# Patient Record
Sex: FEMALE | Race: OTHER | Employment: UNEMPLOYED | URBAN - METROPOLITAN AREA
[De-identification: names, ages, dates, MRNs, and addresses within clinical notes are randomized per-mention and may not be internally consistent; named-entity substitution may affect disease eponyms.]

---

## 2017-03-02 ENCOUNTER — OFFICE VISIT (OUTPATIENT)
Dept: FAMILY MEDICINE CLINIC | Age: 27
End: 2017-03-02

## 2017-03-02 VITALS
HEIGHT: 63 IN | WEIGHT: 132 LBS | SYSTOLIC BLOOD PRESSURE: 117 MMHG | BODY MASS INDEX: 23.39 KG/M2 | DIASTOLIC BLOOD PRESSURE: 68 MMHG | HEART RATE: 66 BPM | TEMPERATURE: 97.3 F

## 2017-03-02 DIAGNOSIS — M79.671 RIGHT FOOT PAIN: Primary | ICD-10-CM

## 2017-03-02 RX ORDER — NAPROXEN 500 MG/1
500 TABLET ORAL 2 TIMES DAILY WITH MEALS
Qty: 60 TAB | Refills: 0 | Status: SHIPPED | OUTPATIENT
Start: 2017-03-02 | End: 2021-05-28

## 2017-03-02 NOTE — LETTER
3/2/2017 2:41 PM 
 
Ms. Ramsey Pollock [address] [id number] To whom it may concern: It is my recommendation and medical opinion that continued use of the monitoring bracelet on  
Ms. Ernst's right leg/ankle is hazardous to her health and it be removed immediately. Re-application to her left leg may cause similar symptoms/health problems. Please find an alternate mode of monitoring per your agency's protocol that will not restrict circulation. Please contact our office with any questions. Thank you, Hermann Shetty.  AMY aRi

## 2017-03-02 NOTE — PATIENT INSTRUCTIONS
Dolor de pie: Instrucciones de cuidado - [ Foot Pain: Care Instructions ]  Instrucciones de cuidado  Las lesiones de pie que causan dolor e Teri Sanam son bastante comunes. Evelyn todos los deportes o trabajos de reparación en el hogar pueden causar un paso en falso que termine con un dolor en el pie. El desgaste normal, sobre todo al [employer], también puede causar [employer]. La mayoría de las lesiones menores del pie sanarán por sí solas, y el tratamiento en el hogar suele ser todo lo que necesita. Connie si tiene wei lesión grave, quizás necesite exámenes y [place]. La atención de seguimiento es wei parte clave de cunha tratamiento y seguridad. Asegúrese de hacer y acudir a todas las citas, y llame a cunha médico si está teniendo problemas. También es wei buena idea saber los resultados de los exámenes y mantener wei lista de los medicamentos que [place]. ¿Cómo puede cuidarse en el hogar? · [employer] analgésicos (medicamentos para el dolor) exactamente yoel le fueron indicados. ¨ Si el médico le recetó un analgésico, tómelo según las indicaciones. ¨ Si no está tomando un analgésico recetado, pregúntele a cunha médico si puede giuseppe un medicamento de [employer]. · Descanse y proteja cunha pie. Deje de hacer cualquier actividad que pudiera causarle dolor. · Colóquese hielo o wei compresa fría en el pie anamaria 10 a 20 minutos cada vez. Póngase un paño spear entre el hielo y la piel. · Eleve el pie adolorido sobre wei almohada cuando se aplique hielo o en cualquier momento que se siente o acueste anamaria los 3 días siguientes. Trate de mantenerlo por encima del nivel del corazón. [place] ayudará a reducir la hinchazón. · Cunha médico podría recomendar que se envuelva el pie con un vendaje elástico. Mantenga el pie envuelto tanto tiempo yoel cunha médico lo recomiende. · Si cunha médico le recomendó usar muletas, úselas según las indicaciones. · Use zapatos amplios.   · Tan pronto yoel el dolor y la hinchazón terminen, comience a hacer ejercicios suaves con el pie. Cunha médico le puede decir qué ejercicios ayudarán. ¿Cuándo debe pedir ayuda? Llame al 911 en cualquier momento que considere que necesita atención de emergencia. Por ejemplo, llame si:  · Cunha pie se pone pálido, blancuzco, azulado o frío. Llame a cunha médico ahora mismo o busque atención médica inmediata si:  · No puede  o pararse en el pie. · Cunha pie se ve torcido o fuera de cunha posición normal.  · Cunha pie no es estable cuando pisa. · Tiene señales de infección, tales yoel:  ¨ Aumento del dolor, la hinchazón, el enrojecimiento o la temperatura. ¨ Vetas rojizas que comienzan en la terrell adolorida. ¨ Pus que supura de wei terrell del pie. Kacey Linear. · Siente cunha pie entumecido o con hormigueo. Preste especial atención a los cambios en cunha maliha y asegúrese de comunicarse con cunha médico si:  · No mejora yoel se esperaba. · Tiene moretones por wei lesión que pa más de 2 semanas. ¿Dónde puede encontrar más información en inglés? Clyde Amezcua a http://flaco-ponce.info/. Eleni Cook G197 en la búsqueda para aprender más acerca de \"Dolor de pie: Instrucciones de cuidado - [ Foot Pain: Care Instructions ]. \"  Revisado: 23 [address], 2016  Versión del contenido: 11.1  © 1686-9355 [employer]. Las instrucciones de cuidado fueron adaptadas bajo licencia por [employer] (which disclaims liability or warranty for this information). Si usted tiene [place] afección médica o sobre estas instrucciones, siempre pregunte a cunha profesional de maliha. Healthwise, Incorporated niega toda garantía o responsabilidad por cunha uso de esta información.

## 2017-03-02 NOTE — PROGRESS NOTES
Subjective:     Chief Complaint   Patient presents with    Ankle Pain        She  is a 27 y.o. female who presents for evaluation of R ankle pain x 5 weeks. Pt notes approx 1 week ago, Pt started to have pain and a pressure like feeling in her leg. Is worse at night. Will sometimes radiate into her posterior thigh/hip. No Hx of varicose veins. Will sometimes swell, mostly at night. Pain gets 8/10 intermittent, worse at night. PMH: denies     Surg: denies     NKDA    Current Rx: last took injection, q3 months for family planning in [place]         ROS  Gen - no fever/chills  Resp - no dyspnea or cough  CV - no chest pain or COLON  Rest per HPI    No past medical history on file. No past surgical history on file. No current outpatient prescriptions on file prior to visit. No current facility-administered medications on file prior to visit. Objective:     Vitals:    03/02/17 1353   BP: 117/68   Pulse: 66   Temp: 97.3 °F (36.3 °C)   TempSrc: Oral   Weight: 132 lb (59.9 kg)   Height: 5' 3.39\" (1.61 m)       Physical Examination:  General appearance - alert, well appearing, and in no distress  Eyes -sclera anicteric  Neck - supple, no significant adenopathy, no thyromegaly  Chest - clear to auscultation, no wheezes, rales or rhonchi, symmetric air entry  Heart - normal rate, regular rhythm, normal S1, S2, no murmurs, rubs, clicks or gallops  Neurological - alert, oriented, no focal findings or movement disorder noted  Abdomen-BS present/WNL x 4 quads, non-tender/distended, soft,no organomegaly        R foot below bracelet  DP pulse +2         L foot/low ext w/o bracelet      Assessment/ Plan:   Sagrario Hwang was seen today for ankle pain. Diagnoses and all orders for this visit:    Right foot pain  -     naproxen (NAPROSYN) 500 mg tablet; Take 1 Tab by mouth two (2) times daily (with meals). PRN Naproxen and ice/elevation. Will write letter requesting removal of device. Will give referral sheet to family planning clinic for [facility] injections. RTC PRN. I have discussed the diagnosis with the patient and the intended plan as seen in the above orders. The patient has received an after-visit summary and questions were answered concerning future plans. I have discussed medication side effects and warnings with the patient as well. The patient verbalizes understanding and agreement with the plan.     Follow-up Disposition: Not on File

## 2017-03-02 NOTE — PROGRESS NOTES
Patient seen in discharge with the assistance of Lori Bhagat . We reviewed the AVS, prescription and pharmacy location. The patient was also given the Family planning resources sheet and the signed letter that the provider wrote for her.  Yaz Souza RN

## 2021-05-28 ENCOUNTER — HOSPITAL ENCOUNTER (OUTPATIENT)
Dept: LAB | Age: 31
Discharge: HOME | End: 2021-05-28

## 2021-05-28 ENCOUNTER — INITIAL PRENATAL (OUTPATIENT)
Dept: FAMILY MEDICINE CLINIC | Age: 31
End: 2021-05-28

## 2021-05-28 VITALS
BODY MASS INDEX: 30.69 KG/M2 | HEIGHT: 63 IN | DIASTOLIC BLOOD PRESSURE: 64 MMHG | HEART RATE: 70 BPM | SYSTOLIC BLOOD PRESSURE: 99 MMHG | TEMPERATURE: 98.1 F | WEIGHT: 173.2 LBS | RESPIRATION RATE: 16 BRPM | OXYGEN SATURATION: 99 %

## 2021-05-28 DIAGNOSIS — Z34.90 ENCOUNTER FOR SUPERVISION OF NORMAL PREGNANCY, ANTEPARTUM, UNSPECIFIED GRAVIDITY: ICD-10-CM

## 2021-05-28 DIAGNOSIS — O99.210 OBESITY AFFECTING PREGNANCY, ANTEPARTUM: ICD-10-CM

## 2021-05-28 DIAGNOSIS — Z34.90 ENCOUNTER FOR SUPERVISION OF NORMAL PREGNANCY, ANTEPARTUM, UNSPECIFIED GRAVIDITY: Primary | ICD-10-CM

## 2021-05-28 LAB
ANTIBODY SCREEN, EXTERNAL: NEGATIVE
BILIRUB UR QL STRIP: NEGATIVE
CHLAMYDIA, EXTERNAL: NEGATIVE
GLUCOSE UR-MCNC: NEGATIVE MG/DL
HBSAG, EXTERNAL: NEGATIVE
HIV, EXTERNAL: NON REACTIVE
KETONES P FAST UR STRIP-MCNC: NORMAL MG/DL
N. GONORRHEA, EXTERNAL: NEGATIVE
PH UR STRIP: 6 [PH] (ref 4.6–8)
PROT UR QL STRIP: NEGATIVE
RPR, EXTERNAL: NON REACTIVE
RUBELLA, EXTERNAL: NORMAL
SP GR UR STRIP: 1.02 (ref 1–1.03)
TYPE, ABO & RH, EXTERNAL: NORMAL
UA UROBILINOGEN AMB POC: NORMAL (ref 0.2–1)
URINALYSIS CLARITY POC: NORMAL
URINALYSIS COLOR POC: YELLOW
URINE BLOOD POC: NEGATIVE
URINE LEUKOCYTES POC: NORMAL
URINE NITRITES POC: POSITIVE

## 2021-05-28 PROCEDURE — 0502F SUBSEQUENT PRENATAL CARE: CPT | Performed by: FAMILY MEDICINE

## 2021-05-28 PROCEDURE — 88175 CYTOPATH C/V AUTO FLUID REDO: CPT

## 2021-05-28 RX ORDER — NITROFURANTOIN (MACROCRYSTALS) 100 MG/1
100 CAPSULE ORAL 2 TIMES DAILY
Qty: 14 CAPSULE | Refills: 0 | Status: SHIPPED | OUTPATIENT
Start: 2021-05-28 | End: 2021-06-02 | Stop reason: SDUPTHER
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
## 2021-05-28 NOTE — PROGRESS NOTES
Chief Complaint   Patient presents with    Initial Prenatal Visit     C/o nausea and is feeling the baby move a little bit. 1. Have you been to the ER, urgent care clinic since your last visit? Hospitalized since your last visit? no    2. Have you seen or consulted any other health care providers outside of the [address] [place] [address] since your last visit? Include any pap smears or colon screening.  no

## 2021-05-29 LAB
ABO + RH BLD: NORMAL
BLOOD BANK CMNT PATIENT-IMP: NORMAL
BLOOD GROUP ANTIBODIES SERPL: NORMAL
ERYTHROCYTE [DISTWIDTH] IN BLOOD BY AUTOMATED COUNT: 13.1 % (ref 11.5–14.5)
EST. AVERAGE GLUCOSE BLD GHB EST-MCNC: 105 MG/DL
HBA1C MFR BLD: 5.3 % (ref 4–5.6)
HBV SURFACE AG SER QL: <0.1 INDEX
HBV SURFACE AG SER QL: NEGATIVE
HCT VFR BLD AUTO: 41.6 % (ref 35–47)
HGB BLD-MCNC: 13.3 G/DL (ref 11.5–16)
HIV 1+2 AB+HIV1 P24 AG SERPL QL IA: NONREACTIVE
HIV12 RESULT COMMENT, HHIVC: NORMAL
MCH RBC QN AUTO: 29.9 PG (ref 26–34)
MCHC RBC AUTO-ENTMCNC: 32 G/DL (ref 30–36.5)
MCV RBC AUTO: 93.5 FL (ref 80–99)
NRBC # BLD: 0 K/UL (ref 0–0.01)
NRBC BLD-RTO: 0 PER 100 WBC
PLATELET # BLD AUTO: 186 K/UL (ref 150–400)
RBC # BLD AUTO: 4.45 M/UL (ref 3.8–5.2)
RPR SER QL: NONREACTIVE
RUBV IGG SER-IMP: REACTIVE
RUBV IGG SERPL IA-ACNC: 10.4 IU/ML
SPECIMEN EXP DATE BLD: NORMAL
WBC # BLD AUTO: 6.8 K/UL (ref 3.6–11)

## 2021-05-30 LAB — VZV IGG SER IA-ACNC: 894 INDEX

## 2021-05-31 LAB
BACTERIA SPEC CULT: ABNORMAL
CC UR VC: ABNORMAL
SERVICE CMNT-IMP: ABNORMAL

## 2021-06-01 ENCOUNTER — TELEPHONE (OUTPATIENT)
Dept: FAMILY MEDICINE CLINIC | Age: 31
End: 2021-06-01

## 2021-06-01 DIAGNOSIS — Z34.90 ENCOUNTER FOR SUPERVISION OF NORMAL PREGNANCY, ANTEPARTUM, UNSPECIFIED GRAVIDITY: ICD-10-CM

## 2021-06-01 NOTE — TELEPHONE ENCOUNTER
Kelsey Nevarez, DO  You 4 days ago   DELPHINE [place] can you let this patient know it looks like she has a UTI and I sent antibiotics to her pharmacy to pick. Pardeep AKERS           Called # listed in demographics, using the  line and a male picked up and states he is Radha's father. I had the  tell him that he is not on her HIPPA form and that we cannot discuss anything.  hung up and called the # listed on patient's HIPPA form and was unable to leave a message on her voicemail.

## 2021-06-02 LAB
DEPRECATED HGB OTHER BLD-IMP: NORMAL %
HGB A MFR BLD: 97.4 % (ref 96.4–98.8)
HGB A2 MFR BLD COLUMN CHROM: 2.6 % (ref 1.8–3.2)
HGB C MFR BLD: NORMAL %
HGB F MFR BLD: 0 % (ref 0–2)
HGB FRACT BLD-IMP: NORMAL
HGB S BLD QL SOLY: NORMAL
HGB S MFR BLD: 0 %

## 2021-06-02 RX ORDER — NITROFURANTOIN (MACROCRYSTALS) 100 MG/1
100 CAPSULE ORAL 2 TIMES DAILY
Qty: 14 CAPSULE | Refills: 0 | Status: SHIPPED | OUTPATIENT
Start: 2021-06-02 | End: 2021-06-09

## 2021-06-02 NOTE — TELEPHONE ENCOUNTER
Using  line, called and spoke to patient. Discussed urine culture results. Patient asked that her medication be called to the [facility] on [address] instead. I resent her med and also reminded patient of next appointment. Patient states understanding.

## 2021-06-09 LAB
C TRACH RRNA CVX QL NAA+PROBE: NEGATIVE
CYTOLOGIST CVX/VAG CYTO: ABNORMAL
CYTOLOGY CVX/VAG DOC THIN PREP: ABNORMAL
HPV APTIMA: POSITIVE
HPV GENOTYPE 18,45: NEGATIVE
HPV GENOTYPE, 16, 507811: POSITIVE
Lab: ABNORMAL
N GONORRHOEA RRNA CVX QL NAA+PROBE: NEGATIVE
PATH REPORT.FINAL DX SPEC: ABNORMAL
STAT OF ADQ CVX/VAG CYTO-IMP: ABNORMAL

## 2021-06-14 ENCOUNTER — HOSPITAL ENCOUNTER (OUTPATIENT)
Dept: PERINATAL CARE | Age: 31
Discharge: HOME | End: 2021-06-14
Attending: OBSTETRICS & GYNECOLOGY

## 2021-06-14 PROCEDURE — 76805 OB US >/= 14 WKS SNGL FETUS: CPT | Performed by: OBSTETRICS & GYNECOLOGY

## 2021-06-21 ENCOUNTER — ROUTINE PRENATAL (OUTPATIENT)
Dept: FAMILY MEDICINE CLINIC | Age: 31
End: 2021-06-21

## 2021-06-21 DIAGNOSIS — O99.210 OBESITY AFFECTING PREGNANCY, ANTEPARTUM: ICD-10-CM

## 2021-06-21 DIAGNOSIS — B97.7: ICD-10-CM

## 2021-06-21 DIAGNOSIS — O98.312: ICD-10-CM

## 2021-06-21 DIAGNOSIS — R82.71 ASYMPTOMATIC BACTERIURIA DURING PREGNANCY: ICD-10-CM

## 2021-06-21 DIAGNOSIS — O99.891 ASYMPTOMATIC BACTERIURIA DURING PREGNANCY: ICD-10-CM

## 2021-06-21 DIAGNOSIS — Z3A.21 21 WEEKS GESTATION OF PREGNANCY: Primary | ICD-10-CM

## 2021-06-21 PROCEDURE — 0502F SUBSEQUENT PRENATAL CARE: CPT | Performed by: STUDENT IN AN ORGANIZED HEALTH CARE EDUCATION/TRAINING PROGRAM

## 2021-06-21 NOTE — PROGRESS NOTES
Amaury Pollock  33 y.o. female  1990  [address]  [id number]    [phone] (home)      [address]:    [facility] Telephone Encounter  Nikolas Duffy MD       Encounter Date: 2021 at 9:02 AM    Consent:  She and/or the health care decision maker is aware that this encounter will be billed as a routine OB appointment and that she may receive a bill for this telephone service, depending on her insurance coverage, and has provided verbal consent to proceed: Yes    Follow-up Prenatal     History of Present Illness    #[id number]    Contractions: no  LOF: no  Vaginal bleeding: no  Fetal movement (if >20 wk): yes    Pt has noticed some veins becoming more visible throughout the pregnancy. She has them in her legs and some visible at the groin. They are not painful, red, warmth. Pt was able to take her antibiotics. She took them, but will need a CHELSY. Pt had anatomy scan on 21, which was normal.     Pt was positive for HPV on her pap. Vitals/Objective:   General: Patient speaking in complete sentences without effort. Normal speech and cooperative. Due to this being a Virtual Check-in/Telephone evaluation, many elements of the physical examination are unable to be assessed. Assessment and Plan:   Amaury Pollock is a 33 y.o.  @ 21w2d evaluated by telephone. 31yo  @ 21w2d by LMP  1. IUP: Rh pos, Abs neg, Hgb fract nml, RPR/HIV/HepB neg, GC/CT neg, pap w/ NILM pos HPV16, A1c 5.3. VZV/Rub imm. Hgb 13.3. UCx pos.  - NIPT low risk, male  - MFM anatomy scan from  w/ normal anatomy. F/u as CI.  2.  Obesity - A1c 5.3.  3.  Bacteruria - Prenatal screening UCx w/ e. Coli. Confirmed finishing macrobid, will need CHELSY at follow up on .  4.  HPV16 positive - pap with NILM. scheduled for colposcopy on . Discussed today in detail including that HPV straing 16 can increase risk of cervical cancer.  Pt confirmed understanding.     -Patient informed of her next appointment: 7/2/2021 for colposcopy  -Advised to come in sooner for any concerns  -Patient was reminded about social distancing and to avoid going into public when possible. Patient understands that this encounter was a temporary measure, and the importance of further follow up and examination was emphasized. Patient verbalized understanding. I affirm this is a Patient Initiated Episode with an Established Patient who has not had a related appointment within my department in the past 7 days or scheduled within the next 24 hours. Note: not billable if this call serves to triage the patient into an appointment for the relevant concern      Electronically Signed: Gee Carter MD  Providers location when delivering service: home      ICD-10-CM ICD-9-CM    1. 21 weeks gestation of pregnancy  Z3A.21 V22.2    2. Obesity affecting pregnancy, antepartum  O99.210 649.13    3. Human papillomavirus (HPV) affecting pregnancy in second trimester  O98.312 647.63     B97.7 079.4    4. Asymptomatic bacteriuria during pregnancy  O99.891 646.50     R82.71         Pursuant to the emergency declaration under the [address] [place] [address], [facility] waiver authority and the [employer] Act, this Virtual  Visit was conducted, with patient's consent, to reduce the patient's risk of exposure to COVID-19 and provide continuity of care for an established patient. History   Patients past medical, surgical and family histories were personally reviewed and updated.   yes    Patient Active Problem List   Diagnosis Code    Right foot pain M79.671    Human papillomavirus (HPV) affecting pregnancy in second trimester O98.312, B97.7    Obesity affecting pregnancy, antepartum O99.210    Asymptomatic bacteriuria during pregnancy O99.891, R82.71              Current Medications/Allergies   Medications and Allergies reviewed:    Current Outpatient Medications   Medication Sig Dispense Refill    prenatal vit calc,iron,folic (PRENATAL VITAMIN PO) Take  by mouth.        No Known Allergies

## 2021-06-22 PROBLEM — O98.312 HUMAN PAPILLOMAVIRUS (HPV) AFFECTING PREGNANCY IN SECOND TRIMESTER: Status: ACTIVE | Noted: 2021-06-22

## 2021-06-22 PROBLEM — R82.71 ASYMPTOMATIC BACTERIURIA DURING PREGNANCY: Status: ACTIVE | Noted: 2021-06-22

## 2021-06-22 PROBLEM — O99.210 OBESITY AFFECTING PREGNANCY, ANTEPARTUM: Status: ACTIVE | Noted: 2021-06-22

## 2021-06-22 PROBLEM — O99.891 ASYMPTOMATIC BACTERIURIA DURING PREGNANCY: Status: ACTIVE | Noted: 2021-06-22

## 2021-06-22 PROBLEM — B97.7 HUMAN PAPILLOMAVIRUS (HPV) AFFECTING PREGNANCY IN SECOND TRIMESTER: Status: ACTIVE | Noted: 2021-06-22

## 2021-07-02 ENCOUNTER — OFFICE VISIT (OUTPATIENT)
Dept: FAMILY MEDICINE CLINIC | Age: 31
End: 2021-07-02

## 2021-07-02 VITALS
BODY MASS INDEX: 30.76 KG/M2 | DIASTOLIC BLOOD PRESSURE: 60 MMHG | HEIGHT: 63 IN | TEMPERATURE: 98.3 F | WEIGHT: 173.6 LBS | HEART RATE: 76 BPM | OXYGEN SATURATION: 99 % | RESPIRATION RATE: 16 BRPM | SYSTOLIC BLOOD PRESSURE: 96 MMHG

## 2021-07-02 DIAGNOSIS — R82.71 ASYMPTOMATIC BACTERIURIA DURING PREGNANCY: ICD-10-CM

## 2021-07-02 DIAGNOSIS — O98.312: ICD-10-CM

## 2021-07-02 DIAGNOSIS — O99.891 ASYMPTOMATIC BACTERIURIA DURING PREGNANCY: ICD-10-CM

## 2021-07-02 DIAGNOSIS — Z3A.21 21 WEEKS GESTATION OF PREGNANCY: Primary | ICD-10-CM

## 2021-07-02 DIAGNOSIS — B97.7: ICD-10-CM

## 2021-07-02 DIAGNOSIS — O99.210 OBESITY AFFECTING PREGNANCY, ANTEPARTUM: ICD-10-CM

## 2021-07-02 PROCEDURE — 0502F SUBSEQUENT PRENATAL CARE: CPT | Performed by: FAMILY MEDICINE

## 2021-07-02 PROCEDURE — 57452 EXAM OF CERVIX W/SCOPE: CPT | Performed by: FAMILY MEDICINE

## 2021-07-02 NOTE — PROGRESS NOTES
I was present during the critical and key portions of the procedure and immediately available to furnish services.    Repeat colpo when PP

## 2021-07-02 NOTE — PROGRESS NOTES
Alecia Ashton is a 33 y.o. female    Chief Complaint   Patient presents with    Colposcopy     She is 22 weeks and 6 days. She is not having vagiianl bleeding or discharge. She is taking her prenatal vitamins. No contractions. She is having fetal movement. No other concerns. 1. Have you been to the ER, urgent care clinic since your last visit? Hospitalized since your last visit? No    2. Have you seen or consulted any other health care providers outside of the [address] [place] [address] since your last visit? Include any pap smears or colon screening. No      Visit Vitals  BP 96/60 (BP 1 Location: Right upper arm, BP Patient Position: Sitting)   Pulse 76   Temp 98.3 °F (36.8 °C) (Oral)   Resp 16   Ht 5' 3.39\" (1.61 m)   Wt 173 lb 9.6 oz (78.7 kg)   SpO2 99%   BMI 30.37 kg/m²           Health Maintenance Due   Topic Date Due    Hepatitis C Screening  Never done    COVID-19 Vaccine (1) Never done    DTaP/Tdap/Td series (1 - Tdap) Never done         Medication Reconciliation completed, changes noted.   Please  Update medication list.

## 2021-07-02 NOTE — PROGRESS NOTES
[facility]  OFFICE PROCEDURE PROGRESS NOTE        Chart reviewed for the following:   Jean Carlos Hinkle MD, have reviewed the History, Physical and updated the Allergic reactions for [address] performed immediately prior to start of procedure:   Jean Carlos Hinkle MD, have performed the following reviews on Luci Speed prior to the start of the procedure:            * Patient was identified by name and date of birth   * Agreement on procedure being performed was verified  * Risks and Benefits explained to the patient  * Procedure site verified and marked as necessary  * Patient was positioned for comfort  * Consent was signed and verified     Time: 10.30 am    Date of procedure: 2021    Procedure performed by: Pricila Cheung MD  and Mike Goins DO    Provider assisted by: Shawn Infante LPN     Patient assisted by: self    How tolerated by patient: tolerated the procedure well with no complications    Post Procedural Pain Scale: 0 - No Hurt       Procedure Details   The risks and benefits of the procedure and written informed consent obtained.     Speculum placed in vagina and excellent visualization of cervix achieved, cervix swabbed with acetic acid solution and viewed through colposcope.     Findings:  Cervix: Ectropic multiparous cervix, there was mild increase in vascularization at 1 o'clock and 6 o'clock, small nabothian cysts with the biggest approx. 3 mm at 11 o'clock. Mild spotting noted. Transition line without visible abnormalities. No cervical lesions noted for biopsy at this time.      Vaginal inspection: normal     Vulvar colposcopy: normal      Specimens: none     Complications: none     Assessment:  Luci Speed 33 y.o.  22w6d here for colpo d/t HPV 16 positive status on recent pap smear, that was unremarkable otherwise.       Plan:  Colpo done today.  No suspicious areas or lesions for biopsy noted on cervix, vagina or vulva.      Repeat colpo PP.
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
## 2021-07-04 LAB
BACTERIA SPEC CULT: NORMAL
SERVICE CMNT-IMP: NORMAL
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
## 2021-07-19 ENCOUNTER — ROUTINE PRENATAL (OUTPATIENT)
Dept: FAMILY MEDICINE CLINIC | Age: 31
End: 2021-07-19

## 2021-07-19 VITALS
WEIGHT: 175 LBS | OXYGEN SATURATION: 100 % | RESPIRATION RATE: 16 BRPM | HEART RATE: 73 BPM | DIASTOLIC BLOOD PRESSURE: 52 MMHG | TEMPERATURE: 97.7 F | BODY MASS INDEX: 31.01 KG/M2 | HEIGHT: 63 IN | SYSTOLIC BLOOD PRESSURE: 103 MMHG

## 2021-07-19 DIAGNOSIS — I83.811 VARICOSE VEINS OF RIGHT LOWER EXTREMITY WITH PAIN: ICD-10-CM

## 2021-07-19 DIAGNOSIS — Z3A.25 25 WEEKS GESTATION OF PREGNANCY: Primary | ICD-10-CM

## 2021-07-19 LAB
ERYTHROCYTE [DISTWIDTH] IN BLOOD BY AUTOMATED COUNT: 13.5 % (ref 11.5–14.5)
GLUCOSE 1H P 100 G GLC PO SERPL-MCNC: 163 MG/DL (ref 65–140)
HCT VFR BLD AUTO: 37.5 % (ref 35–47)
HGB BLD-MCNC: 12 G/DL (ref 11.5–16)
MCH RBC QN AUTO: 29.7 PG (ref 26–34)
MCHC RBC AUTO-ENTMCNC: 32 G/DL (ref 30–36.5)
MCV RBC AUTO: 92.8 FL (ref 80–99)
NRBC # BLD: 0 K/UL (ref 0–0.01)
NRBC BLD-RTO: 0 PER 100 WBC
PLATELET # BLD AUTO: 165 K/UL (ref 150–400)
RBC # BLD AUTO: 4.04 M/UL (ref 3.8–5.2)
WBC # BLD AUTO: 7.4 K/UL (ref 3.6–11)

## 2021-07-19 PROCEDURE — 0502F SUBSEQUENT PRENATAL CARE: CPT | Performed by: STUDENT IN AN ORGANIZED HEALTH CARE EDUCATION/TRAINING PROGRAM

## 2021-07-19 NOTE — PROGRESS NOTES
I reviewed with the resident the medical history and the resident's findings on the physical examination. I discussed with the resident the patient's diagnosis and concur with the plan. 29yo  @ 25w2d by LMP  1. IUP: RH pos, NIPT wnl, GTT today   2. Obesity  3.   Abnormal pap: colpo ok in pregnancy, repeat when PP

## 2021-07-19 NOTE — PATIENT INSTRUCTIONS
Varicose Veins: Care Instructions  Your Care Instructions  Varicose veins are twisted, enlarged veins near the surface of the skin. They develop most often in the legs and ankles. Some people may be more likely than others to get varicose veins because of aging or hormone changes or because a parent has them. Being overweight or pregnant can make varicose veins worse. Jobs that require standing for long periods of time also can make them worse. Follow-up care is a key part of your treatment and safety. Be sure to make and go to all appointments, and call your doctor if you are having problems. It's also a good idea to know your test results and keep a list of the medicines you take. How can you care for yourself at home? · Wear compression stockings during the day to help relieve symptoms. They improve blood flow and are the main treatment for varicose veins. Talk to your doctor about which ones to get and where to get them. · Prop up your legs at or above the level of your heart when possible. This helps keep the blood from pooling in your lower legs and improves blood flow to the rest of your body. · Avoid sitting and standing for long periods. This puts added stress on your veins. · Get regular exercise, and control your weight. Walk, bicycle, or swim to improve blood flow in your legs. · If you bump your leg so hard that you know it is likely to bruise, prop up your leg and put ice or a cold pack on the area for 10 to 20 minutes at a time. Try to do this every 1 to 2 hours for the next 3 days (when you are awake) or until the swelling goes down. Put a thin cloth between the ice and your skin. · If you cut or scratch the skin over a vein, it may bleed a lot. Prop up your leg and apply firm pressure with a clean bandage over the site of the bleeding. Continue to apply pressure for a full 15 minutes. Do not check sooner to see if the bleeding has stopped.  If the bleeding has not stopped after 15 minutes, apply pressure again for another 15 minutes. You can repeat this up to 3 times for a total of 45 minutes. If you have a blood clot in a varicose vein, you may have tenderness and swelling over the vein. The vein may feel firm. Be sure to call your doctor right away if you have these symptoms. If your doctor has told you how to care for the clot, follow his or her instructions. Care may include the following:  · Prop up your leg and apply heat with a warm, damp cloth or a heating pad set on low (put a towel or cloth between your leg and the heating pad to prevent burns). · Ask your doctor if you can take an over-the-counter pain medicine, such as acetaminophen (Tylenol), ibuprofen (Advil, Motrin), or naproxen (Aleve). Be safe with medicines. Read and follow all instructions on the label. When should you call for help? Call 911 anytime you think you may need emergency care. For example, call if:    · You have sudden chest pain and shortness of breath, or you cough up blood. Call your doctor now or seek immediate medical care if:    · You have signs of a blood clot, such as:  ? Pain in your calf, back of the knee, thigh, or groin. ? Redness and swelling in your leg or groin.     · A varicose vein begins to bleed and you cannot stop it.     · You have a tender lump in your leg.     · You get an open sore. Watch closely for changes in your health, and be sure to contact your doctor if:    · Your varicose vein symptoms do not improve with home treatment. Where can you learn more? Go to http://www.gray.com/  Enter B637 in the search box to learn more about \"Varicose Veins: Care Instructions. \"  Current as of: March 4, 2020               Content Version: 12.8  © 2006-2021 [employer]. Care instructions adapted under license by [employer] (which disclaims liability or warranty for this information).  If you have questions about a medical condition or this instruction, always ask your healthcare professional. [place] [address] any warranty or liability for your use of this information.

## 2021-07-19 NOTE — PROGRESS NOTES
Chief Complaint   Patient presents with    Routine Prenatal Visit     25wk 2day prenatal. Patient states that she has a swollen vein on right leg and another on left lip of vagina. She feels pressure when standing on leg however the one on vagina has pressure on and off. Patient first noticed during last exam about a month ago. Patient identified with 2 patient identifiers (name and D. O. B)  Patient is a  at 25w2d    Leakage of Fluid: NO  Vaginal Bleeding: NO  Fetal Movement: YES  Prenatal vitamins: YES  Having Contractions: NO  Pain: NO    Visit Vitals  BP (!) 103/52 (BP 1 Location: Left arm, BP Patient Position: Sitting)   Pulse 73   Temp 97.7 °F (36.5 °C) (Oral)   Resp 16   Ht 5' 3.39\" (1.61 m)   Wt 175 lb (79.4 kg)   LMP 2021 (Exact Date)   SpO2 100%   BMI 30.62 kg/m²

## 2021-07-20 ENCOUNTER — TELEPHONE (OUTPATIENT)
Dept: FAMILY MEDICINE CLINIC | Age: 31
End: 2021-07-20

## 2021-07-20 DIAGNOSIS — R73.09 ABNORMAL GLUCOSE LEVEL: Primary | ICD-10-CM

## 2021-07-20 NOTE — TELEPHONE ENCOUNTER
Called patient in regards to her lab results. CBC wnl. Failed 1 hr gtt (163). I will put in a 3 hr gtt. Will make a lab appointment for patient to come in tomorrow (07/21/2021) at 9 am. Patient understands she has to come fasting.      Signed By: Tushar Miguel MD     July 20, 2021

## 2021-07-21 ENCOUNTER — LAB ONLY (OUTPATIENT)
Dept: FAMILY MEDICINE CLINIC | Age: 31
End: 2021-07-21

## 2021-07-21 DIAGNOSIS — R73.09 ABNORMAL GLUCOSE LEVEL: ICD-10-CM

## 2021-07-21 NOTE — PROGRESS NOTES
Patient became nauseous during 3 hour gestational GTT.  Per verbal order from Dr. Ryan Duarte to administer Ondansetron 4mg SL.  [id number]  EXP-JUN 2024  [id number]

## 2021-07-22 LAB
GESTATIONAL 3HR GTT,GESTA: ABNORMAL
GLUCOSE 1H P 100 G GLC PO SERPL-MCNC: 218 MG/DL (ref 65–180)
GLUCOSE P FAST SERPL-MCNC: 82 MG/DL (ref 65–95)
GLUCOSE, 2 HR,GSTT2: 193 MG/DL (ref 65–155)
GLUCOSE, 3 HR,GSTT3: 112 MG/DL (ref 65–140)

## 2021-07-26 ENCOUNTER — ROUTINE PRENATAL (OUTPATIENT)
Dept: FAMILY MEDICINE CLINIC | Age: 31
End: 2021-07-26

## 2021-07-26 DIAGNOSIS — B97.7: ICD-10-CM

## 2021-07-26 DIAGNOSIS — O99.210 OBESITY AFFECTING PREGNANCY, ANTEPARTUM: ICD-10-CM

## 2021-07-26 DIAGNOSIS — O98.312: ICD-10-CM

## 2021-07-26 DIAGNOSIS — Z3A.26 26 WEEKS GESTATION OF PREGNANCY: ICD-10-CM

## 2021-07-26 DIAGNOSIS — I83.811 VARICOSE VEINS OF RIGHT LOWER EXTREMITY WITH PAIN: ICD-10-CM

## 2021-07-26 DIAGNOSIS — O24.410 DIET CONTROLLED GESTATIONAL DIABETES MELLITUS (GDM) IN SECOND TRIMESTER: Primary | ICD-10-CM

## 2021-07-26 PROCEDURE — 0502F SUBSEQUENT PRENATAL CARE: CPT | Performed by: STUDENT IN AN ORGANIZED HEALTH CARE EDUCATION/TRAINING PROGRAM

## 2021-07-26 RX ORDER — INSULIN PUMP SYRINGE, 3 ML
EACH MISCELLANEOUS
Qty: 1 KIT | Refills: 0 | Status: SHIPPED | OUTPATIENT
Start: 2021-07-26 | End: 2021-10-27

## 2021-07-26 RX ORDER — LANCETS
EACH MISCELLANEOUS
Qty: 1 EACH | Refills: 11 | Status: SHIPPED | OUTPATIENT
Start: 2021-07-26 | End: 2021-10-27

## 2021-07-26 RX ORDER — IBUPROFEN 200 MG
CAPSULE ORAL
Qty: 90 STRIP | Refills: 2 | Status: SHIPPED | OUTPATIENT
Start: 2021-07-26 | End: 2021-10-27

## 2021-07-26 NOTE — PROGRESS NOTES
Spring Licona  33 y.o. female  1990  [address]  [id number]    [phone] (home)      [address]:    [place] Telephone Encounter  Giovanny Bob DO       Encounter Date: 2021 at 3:30 PM    Consent:  She and/or the health care decision maker is aware that this encounter will be billed as a routine OB appointment and that she may receive a bill for this telephone service, depending on her insurance coverage, and has provided verbal consent to proceed: Yes    Follow-up Prenatal     History of Present Illness     Int # [id number]    Contractions: no  LOF: no  Vaginal bleeding: no  Fetal movement (if >20 wk): yes    Had positive 3 hour GGT on       Vitals/Objective:   General: Patient speaking in complete sentences without effort. Normal speech and cooperative. Due to this being a Virtual Check-in/Telephone evaluation, many elements of the physical examination are unable to be assessed. Assessment and Plan:   Spring Licona is a 33 y.o.  @ 26w2d evaluated by telephone. IUP: Rh pos, Abs neg, Hgb fract nml, RPR/HIV/HepB neg, GC/CT neg, pap w/ NILM pos HPV16, A1c 5.3. VZV/Rub imm. Hgb 13.3. UCx pos. 1hr and 3 hr GGT positive,  - NIPT low risk, male  - MFM anatomy scan from  w/ normal anatomy. F/u as CI. Will need growth scan    GDM abnormal 3hr on .  - Blood glucose kit sent to pharmacy today per patient request. Analia Bunting her to check her glucose 4 times daily with goal of fasting <95, and 2 hr pp <120 and to keep a log  - discussed the risks of uncontrolled diabetes including death, birth injury, increased risk of preeclampsia, and diabetes postpartum  -referred to diabetes education  -will need growth scan     Obesity - A1c 5.3.     Bacteruria - Prenatal screening UCx w/ e. Coli. Confirmed finishing macrobid. CHELSY neg      Abnormal pap - pap with NILM.  s/p Colpo :No suspicious areas or lesions for biopsy noted on cervix, vagina or vulva. - Repeat Colpo pp      Varicose vein: present on the lateral aspect of right leg. Tenderness to palpation. Per patient is still present  - Right LE duplex ordered, will follow up    -Patient informed of her next appointment: 8/16/2021, but will send message to have patient seen sooner  -Advised to come in sooner for any concerns  -Pt informed that after 28 wk she will be asked to do weekly home BP monitoring and it was recommended she buy or borrow a cuff ahead of time. Alternative is going to a grocery store/pharmacy to check. One BP should be checked weekly and written in a log >28 weeks.  -Patient educated on kick counts (after 28 weeks): baby should move 10 times in 2 hours (can be a kick, roll, flutter, swish). -Patient was reminded about social distancing and to avoid going into public when possible. Patient understands that this encounter was a temporary measure, and the importance of further follow up and examination was emphasized. Patient verbalized understanding. I affirm this is a Patient Initiated Episode with an Established Patient who has not had a related appointment within my department in the past 7 days or scheduled within the next 24 hours. Note: not billable if this call serves to triage the patient into an appointment for the relevant concern      Electronically Signed: Yessica Nogueira DO  Providers location when delivering service: Home      ICD-10-CM ICD-9-CM    1. Diet controlled gestational diabetes mellitus (GDM) in second trimester  O24.410 648.83 REFERRAL TO DIABETIC EDUCATION      Blood-Glucose Meter monitoring kit      lancets misc      glucose blood VI test strips (blood glucose test) strip   2. 26 weeks gestation of pregnancy  Z3A.26 V22.2    3. Varicose veins of right lower extremity with pain  I83.811 454.8    4. Human papillomavirus (HPV) affecting pregnancy in second trimester  O98.312 647.63     B97.7 079.4    5.  Obesity affecting pregnancy, antepartum  O99.210 649.13        Pursuant to the emergency declaration under the [facility] [place] waiver authority and the [employer] and [employer] Act, this Virtual  Visit was conducted, with patient's consent, to reduce the patient's risk of exposure to COVID-19 and provide continuity of care for an established patient. History   Patients past medical, surgical and family histories were personally reviewed and updated. yes    Patient Active Problem List   Diagnosis Code    Right foot pain M79.671    Human papillomavirus (HPV) affecting pregnancy in second trimester O98.312, B97.7    Obesity affecting pregnancy, antepartum O99.210    Asymptomatic bacteriuria during pregnancy O99.891, R82.71              Current Medications/Allergies   Medications and Allergies reviewed:    Current Outpatient Medications   Medication Sig Dispense Refill    Blood-Glucose Meter monitoring kit Relion monitor. Use as directed 1 Kit 0    lancets misc Check blood glucose 4 times daily. Fasting and 2 hour after breakfast, lunch, and dinner. Goal of glucose <95 fasting and <120 postprandial 1 Each 11    glucose blood VI test strips (blood glucose test) strip Check blood glucose 4 times daily. Fasting and 2 hour after breakfast, lunch, and dinner. Goal of glucose <95 fasting and <120 postprandial 90 Strip 2    prenatal vit calc,iron,folic (PRENATAL VITAMIN PO) Take  by mouth.        No Known Allergies

## 2021-07-26 NOTE — PROGRESS NOTES
I reviewed with the resident the medical history and the resident's findings on the physical examination. I discussed with the resident the patient's diagnosis and concur with the plan. 29yo  @ 26w2d by LMP  1. IUP: RH pos, NIPT wnl, GTT today   2. Obesity  3. Abnormal pap: colpo ok in pregnancy, repeat when PP   4. GDM: Discussed risks of uncontrolled glucose including but not limited to macrosomia, birth injury, increased need for , birth defects, miscarriage/fetal demise,  hypoglycemia, persistent diabetes after pregnancy. Will start checking QID. Will schedule her for follow up in about a week or so. Will need growth scan in 3rd tri.

## 2021-08-09 ENCOUNTER — CLINICAL SUPPORT (OUTPATIENT)
Dept: DIABETES SERVICES | Age: 31
End: 2021-08-09

## 2021-08-09 DIAGNOSIS — O24.410 DIET CONTROLLED GESTATIONAL DIABETES MELLITUS (GDM) IN THIRD TRIMESTER: Primary | ICD-10-CM

## 2021-08-09 PROCEDURE — G0108 DIAB MANAGE TRN  PER INDIV: HCPCS | Performed by: DIETITIAN, REGISTERED

## 2021-08-09 NOTE — PROGRESS NOTES
[facility] Program for Diabetes Health  Diabetes Self-Management Education & Support Program  Encounter note    SUMMARY  Diabetes self-care management training was completed related to Healthy eating and Monitoring. The participant will return on August 16 to continue DSMES related to Healthy eating, Monitoring and Physical activity. The participant did identify SMART Goal(s): - to write down food intake for 3 days before next appt , and will practice knowledge and skills related to healthy eating and monitoring to improve diabetes self-management. EVALUATION:  Pt is already checking her BS but reports no other education at this time. She did not bring her BS with her but verbal report appears they are all in target but she is very hungry. Admits she loves soda and wants to know if she can still drink. [address] has her making lemonade with sugar packets. Does not appear to be an issue at this time but shared we may have to d/c this practice. . Pt does not have any exercise at this time. She was able to return demo on a plate with correct amounts of food using models. Pt reports even based on amount of food shown she would still be hungry Reminded her she can have some snacks as well if needed    RECOMMENDATIONS:  Continue to check BS 4/day- 2 hour check starts with first bite of food not the last  Limit carbs at meal per written guidelines shared and add snacks if hungry   Write down 3 days of food intake and bring to next appt    Next provider visit is scheduled for 8/16/21     was used from Kamlesh Oliveros # [id number] with [facility] EVALUATION     8/9/2021 WHAT IS DIABETES?   a. Role of the normal pancreas in energy balance and blood glucose control   b. The defect seen in diabetes   c. Signs & symptoms of diabetes   d. Diagnosis of diabetes   e.  Types of diabetes   f. Blood glucose targets in non-pregnant & non-pregnant adults       The participant knows   Their type of diabetes Yes   The basic physiologic defect No   Blood glucose targets No       DATE DSMES TOPIC EVALUATION     8/9/2021 WHAT CAN I EAT?   a. General principles   b. Determining a healthy weight   c. Nutritional terms & tools    Healthy Plate method    Carbohydrate Counting    Reading food labels    Free apps   d. Pregnancy recommendations      The participant    Uses Healthy Plate principles in constructing meals No   Reads food labels in choosing acceptable foods No    The participant would benefit from eating all carbs allowed to help hunger. DATE DSMES TOPIC EVALUATION     8/9/2021 HOW CAN BLOOD GLUCOSE MONITORING HELP ME?   a. Value of blood glucose monitoring   b. Realistic expectations   c. Blood glucose monitoring targets   d. Target adjustments   e. Setting a1c & blood glucose targets with provider   f. Meter selection    g. Technique for obtaining blood droplet   h. Blood glucose testing sites   i. Determining best times to test   j. Pregnancy recommendations   k. Data sharing with provider        The participant    Can demonstrate their glucometer procedure Yes   Logs their BG readings Yes, to bring to next visit    The participant would benefit from continue current testing. [employer] Program for Diabetes Health  Diabetes Self-Management Education & Support Program  Pre-program Assessment    Reason for Referral: GDM  Referral Source: Pollo Cho,*  Services requested: Pregnancy DSMES    ASSESSMENT    From my perspective, the participant would benefit from [facility] specifically related to Healthy eating, Monitoring and Physical activity. During the program, we will focus on providing DSMES that specifically addresses participant's interest in Healthy eating, as shown by their reported readiness to change. The participant would be best served by attending  individual sessions.     Diabetes Self-Management Education Follow-up Visit: 8/16/21       Clinical Presentation  Diana Flor is a 33 y.o.  female referred for diabetes self-management education. Participant has GDM for <1 year. Family history positive for diabetes. Patient reports not receiving DSMES services in the past.     Pt is due 10/29/21 and is . She has not had GDM in previous pregnancies. Reports she was very sick at the beginning with this pregnancy and is still using Ensure but no issues now. She does not know how much wgt she has gained. Per pt she feels she has actually lost wgt. Reports she used to be 145# but no time frame provided. Per flowsheet she has only gained 2# since May.     21 1 hr GTT  163 mg/dl  21 3 hr GTT 82, 218, 193, 112 mg/dl       Most recent A1c value:   Lab Results   Component Value Date/Time    Hemoglobin A1c 5.3 2021 04:25 PM         Diabetes-related medications:  Current dosing:   Key Antihyperglycemic Medications     Patient is on no antihyperglycemic meds. Learning Assessment  Learning objectives Educator assessment (2021)   Diabetes Disease Process  The participant can   A) describe diabetes in basic terms;   B) state the type of diabetes they have; &   C) state accepted blood glucose targets. Healthy Eating  The participant can   A) identify carbohydrate foods; &   B) accurately read food labels. Being Active  The participant can  A) state the benefits of physical activity;  B) report their current PA practices;  C) identify PA they would consider incorporating in their lives; &  D) develop an implementation plan. Monitoring  The participant can  A) operate their blood glucose meter; &  B) describe how they log their blood glucoses to share with their provider. Taking Medications  The participant can  A) name their diabetes medications;  B) state the purpose and dose;  C) note side effects; &  D) describe proper storage, disposal & transport (if appropriate).      Healthy Coping  The participant can    A) describe their response to diabetes diagnosis; B) describe their specific coping mechanisms;  C) identify supportive people and/or other resources that positively support their diabetes self-care and health. Reducing Risks  The participant can describe the preventive measures used by providers to promote health and prevent diabetes complications. Problem Solving  The participant can   A) identify signs, symptoms & treatment of hypoglycemia;   B) identify signs, symptoms & treatment of hyperglycemia;  C) describe their sick day plan; &  D) identify BG patterns to discuss with their provider. No  No  No        No  No        No  No  No  No        Yes  Yes          pt is not on medications at this time            No  No  No        No with regards to GDM          No  No  No  No     Characteristics to Learning   Barriers to Learning   [] Cognitive loss  [] Mental retardation   [] Intellectual delay/cognitive impairment  [] Psychiatric disorder  [] Visually impaired  [] Hearing loss                 [] Low literacy (difficulty with written text)  [] Low numeracy (difficulty with mathematical information  [] Low health literacy (difficulty with understanding health information & services  [x] Language  [] Functional limitation  [] Pain   [] Financial  [] Transportation  [] None   Favorite Ways to Learn   [] Lecture  [] Slides  [x] Reading [] Video-Internet  [] Cassettes/CDs/MP3's  [] Interactive Small Groups [] Other       Behavioral Assessment  Current self-care practices  Educator assessment (8/9/2021)   Healthy Eating  Current practices  24-hour Dietary Recall:  Breakfast: 2 scrambled eggs; 1 slice bread; tomato sauce; lemonade  Lunch: beef soup made with potatoes, cabbage, beef and corn; rice~1 cup, lemonade  Dinner: broth from soup; 2 tortillas and beef; lemonade  Snacks: fruit ice or jello?     Beverages: water; fruit juice from [facility], soda on occasion       Would benefit from [facility] related to Healthy Eating: Yes      Eats a carbohydrate controlled diet: No      Stage of change: Action   Being Active  Current practices  How many days during the past week have you performed physical activity where your heart beats faster and your breathing is harder than normal for 30 minutes or more?  0 days    How many days in a typical week do you perform activity such as this?  0 days     Would benefit from [facility] related to Being Active: Yes      Exercises 150 minutes/week: No      Stage of change: Contemplation     Monitoring  Current practices  Do you monitor your blood sugar? Yes    How often do you monitor? 4x/day    What are the range of readings? 74-95 mg/dL  Breakfast: 74-77 mg/dL  2 hours post breakfast: 78-80 mg/dl  2 hours post lunch: 88-95 mg/dl  2 hours post dinner: 90-95 mg/dl    All reading per verbal discussion; no records brought    Do you know your last A1c measurement? No    Do you know the meaning of the A1c? No     Would benefit from [facility] related to Monitoring: Yes      Uses BG readings to establish trends and understand BG patterns: No      Stage of change: Action   Taking Medication  Current practices  Do you understand what your diabetes medications do? na    How often do you miss doses of your diabetes medications? Not taking diabetes medication    Can you afford your diabetes medications? na   Would benefit from [facility] related to Taking Medication: No      Takes medications consistently to receive full benefit: na      Stage of change: na       Healthy Coping   Current state     Would benefit from [facility] SYSTEM related to Healthy Coping: Yes      Identifies specific people, organizations,etc, that actively support their diabetes self-care efforts: No      Stage of change: Preparation     Reducing Risks  Current state  Vaccines:  Influenza: There is no immunization history on file for this patient.       Heart Protection:  BP Readings from Last 2 Encounters:   07/19/21 (!) 103/52   07/02/21 96/60 Would benefit from [facility] related to Reducing Risks: No      Actively participates in decision-making with provider regarding secondary prevention:  Yes      Stage of change: na at this time   Problem Solving  Current state  Hypoglycemia Management:  What are signs and symptoms of hypoglycemia that you experience? Pt reported being unaware of s/s of hypoglycemia    How do you prevent hypoglycemia? Pt reported being unaware of how to prevent hypoglycemia    How do you treat hypoglycemia? Pt reported being unaware of how to treat hypoglycemia    Hyperglycemia Management:  What are signs and symptoms of hyperglycemia that you experience? Pt reported being unaware of s/s of hyperglycemia    How can you prevent hyperglycemia? Pt reported being unaware of how to prevent hyperglycemia    Sick Day Management:  What do you do differently on sick days? Pt reported being unaware of self-management on sick days    Pattern Management:  Do you notice blood glucose patterns when you look at the readings in your meter or logbook? No    How do you use the blood glucose readings from your meter or logbook?  Pt reported being unaware of pattern management skills     Would benefit from [facility] SYSTEM related to Problem Solving: No      Articulates appropriate strategies to address hypoglycemia, hyperglycemia, sick day care and BG pattern: No      Stage of change: Not appropriate at this time     Note: Content derived from the American Association of Diabetes Educators' Diabetes Education Curriculum: A Guide to Successful Self-Management (3rd edition)      Maximilian Abdi RD , [facility] on 8/9/2021 at 11:00 AM    Encounter date: 8/9/2021  Time in appointment: 80 minutes

## 2021-08-16 ENCOUNTER — ROUTINE PRENATAL (OUTPATIENT)
Dept: FAMILY MEDICINE CLINIC | Age: 31
End: 2021-08-16

## 2021-08-16 VITALS
SYSTOLIC BLOOD PRESSURE: 91 MMHG | TEMPERATURE: 97.5 F | HEART RATE: 76 BPM | DIASTOLIC BLOOD PRESSURE: 59 MMHG | OXYGEN SATURATION: 99 % | BODY MASS INDEX: 30.65 KG/M2 | WEIGHT: 173 LBS | RESPIRATION RATE: 17 BRPM | HEIGHT: 63 IN

## 2021-08-16 DIAGNOSIS — Z23 ENCOUNTER FOR IMMUNIZATION: ICD-10-CM

## 2021-08-16 DIAGNOSIS — Z3A.29 29 WEEKS GESTATION OF PREGNANCY: Primary | ICD-10-CM

## 2021-08-16 PROCEDURE — 90715 TDAP VACCINE 7 YRS/> IM: CPT | Performed by: STUDENT IN AN ORGANIZED HEALTH CARE EDUCATION/TRAINING PROGRAM

## 2021-08-16 PROCEDURE — 0502F SUBSEQUENT PRENATAL CARE: CPT | Performed by: STUDENT IN AN ORGANIZED HEALTH CARE EDUCATION/TRAINING PROGRAM

## 2021-08-16 RX ORDER — FAMOTIDINE 20 MG/1
20 TABLET, FILM COATED ORAL 2 TIMES DAILY
Qty: 60 TABLET | Refills: 3 | Status: ON HOLD | OUTPATIENT
Start: 2021-08-16 | End: 2021-10-25

## 2021-08-16 NOTE — PROGRESS NOTES
I reviewed with the resident the medical history and the resident's findings on the physical examination. I discussed with the resident the patient's diagnosis and concur with the plan. 31yo  @ 29w2d by LMP  1. IUP: RH pos, NIPT wnl, GTT today   2. GDM: Discussed risks of uncontrolled glucose including but not limited to macrosomia, birth injury, increased need for , birth defects, miscarriage/fetal demise,  hypoglycemia, persistent diabetes after pregnancy. Seen by DM ed. Glucoses at goal verbally, counseled to bring log to next visit. Scheduling growth scan. 3.  Abnormal pap: colpo ok in pregnancy, repeat when PP   4. Varicose veins: counseled normal in pregnancy   5.   Obesity
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
## 2021-09-01 ENCOUNTER — ROUTINE PRENATAL (OUTPATIENT)
Dept: FAMILY MEDICINE CLINIC | Age: 31
End: 2021-09-01
Payer: MEDICAID

## 2021-09-01 VITALS
BODY MASS INDEX: 31.22 KG/M2 | OXYGEN SATURATION: 96 % | SYSTOLIC BLOOD PRESSURE: 111 MMHG | HEIGHT: 63 IN | DIASTOLIC BLOOD PRESSURE: 65 MMHG | HEART RATE: 83 BPM | TEMPERATURE: 98.6 F | WEIGHT: 176.2 LBS

## 2021-09-01 DIAGNOSIS — O99.210 OBESITY AFFECTING PREGNANCY, ANTEPARTUM: ICD-10-CM

## 2021-09-01 DIAGNOSIS — O24.410 DIET CONTROLLED GESTATIONAL DIABETES MELLITUS (GDM), ANTEPARTUM: ICD-10-CM

## 2021-09-01 DIAGNOSIS — O99.891 ASYMPTOMATIC BACTERIURIA DURING PREGNANCY: ICD-10-CM

## 2021-09-01 DIAGNOSIS — R82.71 ASYMPTOMATIC BACTERIURIA DURING PREGNANCY: ICD-10-CM

## 2021-09-01 DIAGNOSIS — Z34.90 PREGNANCY, UNSPECIFIED GESTATIONAL AGE: Primary | ICD-10-CM

## 2021-09-01 DIAGNOSIS — K21.9 GASTROESOPHAGEAL REFLUX DISEASE, UNSPECIFIED WHETHER ESOPHAGITIS PRESENT: ICD-10-CM

## 2021-09-01 DIAGNOSIS — B97.7: ICD-10-CM

## 2021-09-01 DIAGNOSIS — O98.312: ICD-10-CM

## 2021-09-01 PROCEDURE — 0502F SUBSEQUENT PRENATAL CARE: CPT | Performed by: STUDENT IN AN ORGANIZED HEALTH CARE EDUCATION/TRAINING PROGRAM

## 2021-09-01 RX ORDER — DIPHENHYDRAMINE HCL 25 MG
TABLET ORAL SEE ADMIN INSTRUCTIONS
COMMUNITY
Start: 2021-07-26 | End: 2021-10-27

## 2021-09-01 RX ORDER — LANCETS 33 GAUGE
EACH MISCELLANEOUS
COMMUNITY
Start: 2021-07-26 | End: 2021-10-27

## 2021-09-01 NOTE — PATIENT INSTRUCTIONS
Semanas 30 a 32 de bowman embarazo: Instrucciones de cuidado  Weeks 30 to 32 of Your Pregnancy: Care Instructions  Instrucciones de cuidado    Ha llegado a los últimos meses de embarazo. A estas [place], bowman bebé en verdad comienza a tener la apariencia de un bebé, con daniela y piel rellenita. A medida que entra en las últimas semanas de [facility] comenzará a caer en la realidad de que va a tener un bebé. Collette es el momento de elegir un nombre, ordenar bowman casa, organizar un cuarto de niños seguro y buscar atención infantil de calidad, de ser necesaria. Hacer esto con anterioridad le permitirá concentrarse en cuidar y disfrutar de bowman bebé. También podría hacer wei visita a la dyan de partos del hospital para Dorann Fairy mejor idea sobre qué esperar mientras está en el hospital.  Anamaria estos últimos meses, es muy importante que se cuide y preste atención a lo que bowman cuerpo necesita. Si bowman médico le dice que está rom que trabaje, no se exija demasiado. Siga las recomendaciones proporcionadas en esta hoja de cuidados para aliviar la acidez estomacal y [address] várices. Si todavía no le valdez aplicado la vacuna Tdap (tétanos, difteria y tos [place]) anamaria collette [facility], hable con bowman médico acerca de aplicársela. Katina Morale a proteger a bowman recién nacido contra la infección por tos ferina. La atención de seguimiento es wei parte clave de bowman tratamiento y seguridad. Asegúrese de hacer y acudir a todas las citas, y llame a bowman médico si está teniendo problemas. También es wei buena idea saber los resultados de roxana exámenes y mantener wei lista de los medicamentos que [place]. ¿Cómo puede cuidarse en el hogar? Preste atención a los movimientos de bowman bebé  · Debería sentir que bowman bebé se mueve varias veces al día. · Bowman bebé ahora cambia menos de posición, y patea y da golpes con más frecuencia. · Bowman bebé duerme de 20 a 45 minutos cada [facility] y [address] en determinados momentos del día.   · Si bowman médico quiere que cuente las patadas de bowman bebé:  ? Bonnetta Kinds bowman vejiga y acuéstese de lado o recuéstese en wei silla cómoda. ? Anote la hora inicial.  ? Preste atención solo a los movimientos de bowman bebé. Cuente todos los movimientos, excepto los del hipo. ? Después de que haya contado 10 movimientos, anote la hora. ? Anote cuántos minutos le llevaron a bowman bebé los 10 movimientos. ? Si después de wei hora no ha registrado 10 movimientos, coma o deric algo, y luego cuente por otra hora. Si no registra 10 movimientos en cualquiera de las horas, llame a bowman médico.  Alivie la acidez estomacal  · Coma comidas pequeñas y frecuentes. · No coma chocolate, menta ni comidas muy picantes. Evite las bebidas con cafeína, yoel el café, el té y las sodas. · Evite doblarse hacia adelante o acostarse después de comer. · Alda wei caminata corta después de comer. · Si tiene problemas de [place] estomacal anamaria la noche, no coma por 2 horas antes de acostarse. · [place] antiácidos, yoel Mylanta, Maalox, Rolaids o Tums. No tome antiácidos que contengan bicarbonato de sodio. Cuide las várices  · Las várices son vasos sanguíneos que se dilatan debido a la mayor cantidad de cory anamaria el embarazo. Puede tener dolor o palpitaciones en las piernas. La mayoría de las várices desaparecerán después del Bim. · Evite estar mccrary anamaria mucho tiempo. Siéntese con las piernas cruzadas a la altura de los tobillos, no de las rodillas. · Siéntese con los pies elevados. · Evite usar ropa o medias ajustadas. Use medias de compresión. · Alda ejercicio con regularidad. Trate de caminar por lo menos 30 minutos al día. ¿Dónde puede encontrar más información en inglés? Vaya a [email or web address]  Corinna [id number] en la búsqueda para aprender más acerca de \"Semanas 30 a 28 de bowman embarazo: Instrucciones de cuidado. \"  Revisado: 8 octubre, 2020               Versión del contenido: 12.8  © 1575-7270 Healthwise, Incorporated.    Grant Youssef instrucciones de cuidado fueron adaptadas bajo licencia por [facility] (which disclaims liability or warranty for this information). Si usted tiene [place] afección médica o sobre estas instrucciones, siempre pregunte a bowman profesional de maliha. [facility] niega toda garantía o responsabilidad por bowman uso de esta información.

## 2021-09-01 NOTE — PROGRESS NOTES
Chief Complaint   Patient presents with    Routine Prenatal Visit     31w4d. No LOF, no bleeding. +fetal movement. No ravi petersen      Visit Vitals  /65 (BP 1 Location: Left upper arm, BP Patient Position: Sitting)   Pulse 83   Temp 98.6 °F (37 °C) (Oral)   Ht 5' 3.39\" (1.61 m)   Wt 176 lb 3.2 oz (79.9 kg)   SpO2 96%   BMI 30.83 kg/m²     1. Have you been to the ER, urgent care clinic since your last visit? Hospitalized since your last visit? No    2. Have you seen or consulted any other health care providers outside of the [address] [place] [address] since your last visit? Include any pap smears or colon screening.  No
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
## 2021-09-01 NOTE — PROGRESS NOTES
I reviewed with the resident the medical history and the resident's findings on the physical examination. I discussed with the resident the patient's diagnosis and concur with the plan. Log mostly at goal    31yo  @ 31w4d by LMP  1. IUP: RH pos, NIPT wnl  2. A1DM: Discussed risks of uncontrolled glucose including but not limited to macrosomia, birth injury, increased need for , birth defects, miscarriage/fetal demise,  hypoglycemia, persistent diabetes after pregnancy. Seen by DM ed. Glucoses at goal verbally, counseled to bring log to next visit. Growth scan scheduled   3. Abnormal pap: colpo ok in pregnancy, repeat when PP   4. Varicose veins: counseled normal in pregnancy   5.   Obesity

## 2021-09-15 ENCOUNTER — HOSPITAL ENCOUNTER (OUTPATIENT)
Dept: PERINATAL CARE | Age: 31
Discharge: HOME | End: 2021-09-15
Attending: OBSTETRICS & GYNECOLOGY
Payer: MEDICAID

## 2021-09-15 ENCOUNTER — ROUTINE PRENATAL (OUTPATIENT)
Dept: FAMILY MEDICINE CLINIC | Age: 31
End: 2021-09-15
Payer: MEDICAID

## 2021-09-15 VITALS
OXYGEN SATURATION: 100 % | WEIGHT: 176 LBS | TEMPERATURE: 98.2 F | HEART RATE: 65 BPM | SYSTOLIC BLOOD PRESSURE: 94 MMHG | RESPIRATION RATE: 18 BRPM | BODY MASS INDEX: 31.18 KG/M2 | DIASTOLIC BLOOD PRESSURE: 59 MMHG | HEIGHT: 63 IN

## 2021-09-15 DIAGNOSIS — O99.210 OBESITY AFFECTING PREGNANCY, ANTEPARTUM: ICD-10-CM

## 2021-09-15 DIAGNOSIS — Z3A.33 33 WEEKS GESTATION OF PREGNANCY: Primary | ICD-10-CM

## 2021-09-15 DIAGNOSIS — R82.71 ASYMPTOMATIC BACTERIURIA DURING PREGNANCY: ICD-10-CM

## 2021-09-15 DIAGNOSIS — O98.312: ICD-10-CM

## 2021-09-15 DIAGNOSIS — O24.410 DIET CONTROLLED GESTATIONAL DIABETES MELLITUS (GDM), ANTEPARTUM: ICD-10-CM

## 2021-09-15 DIAGNOSIS — B97.7: ICD-10-CM

## 2021-09-15 DIAGNOSIS — O99.891 ASYMPTOMATIC BACTERIURIA DURING PREGNANCY: ICD-10-CM

## 2021-09-15 PROCEDURE — 76816 OB US FOLLOW-UP PER FETUS: CPT | Performed by: OBSTETRICS & GYNECOLOGY

## 2021-09-15 PROCEDURE — 0502F SUBSEQUENT PRENATAL CARE: CPT | Performed by: STUDENT IN AN ORGANIZED HEALTH CARE EDUCATION/TRAINING PROGRAM

## 2021-09-15 NOTE — PROGRESS NOTES
Identified Patient with two Patient identifiers (Name and ). Two Patient Identifiers confirmed. Reviewed record in preparation for visit and have obtained necessary documentation. Chief Complaint   Patient presents with    Routine Prenatal Visit     33w4d       Visit Vitals  BP (!) 94/59 (BP 1 Location: Right arm, BP Patient Position: Sitting, BP Cuff Size: Adult)   Pulse 65   Temp 98.2 °F (36.8 °C) (Oral)   Resp 18   Ht 5' 3.3\" (1.608 m)   Wt 176 lb (79.8 kg)   SpO2 100%   BMI 30.88 kg/m²       1. Have you been to the ER, urgent care clinic since your last visit? Hospitalized since your last visit? No    2. Have you seen or consulted any other health care providers outside of the [address] [place] [address] since your last visit? Include any pap smears or colon screening.  No
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
## 2021-09-29 ENCOUNTER — ROUTINE PRENATAL (OUTPATIENT)
Dept: FAMILY MEDICINE CLINIC | Age: 31
End: 2021-09-29
Payer: MEDICAID

## 2021-09-29 VITALS
HEIGHT: 63 IN | RESPIRATION RATE: 20 BRPM | DIASTOLIC BLOOD PRESSURE: 67 MMHG | HEART RATE: 69 BPM | WEIGHT: 178 LBS | BODY MASS INDEX: 31.54 KG/M2 | SYSTOLIC BLOOD PRESSURE: 106 MMHG | OXYGEN SATURATION: 100 % | TEMPERATURE: 98.5 F

## 2021-09-29 DIAGNOSIS — B97.7: ICD-10-CM

## 2021-09-29 DIAGNOSIS — O99.891 ASYMPTOMATIC BACTERIURIA DURING PREGNANCY: ICD-10-CM

## 2021-09-29 DIAGNOSIS — O98.312: ICD-10-CM

## 2021-09-29 DIAGNOSIS — K21.9 GASTROESOPHAGEAL REFLUX DISEASE, UNSPECIFIED WHETHER ESOPHAGITIS PRESENT: ICD-10-CM

## 2021-09-29 DIAGNOSIS — O99.210 OBESITY AFFECTING PREGNANCY, ANTEPARTUM: ICD-10-CM

## 2021-09-29 DIAGNOSIS — O24.410 DIET CONTROLLED GESTATIONAL DIABETES MELLITUS (GDM), ANTEPARTUM: ICD-10-CM

## 2021-09-29 DIAGNOSIS — R82.71 ASYMPTOMATIC BACTERIURIA DURING PREGNANCY: ICD-10-CM

## 2021-09-29 DIAGNOSIS — Z3A.35 35 WEEKS GESTATION OF PREGNANCY: Primary | ICD-10-CM

## 2021-09-29 LAB
ERYTHROCYTE [DISTWIDTH] IN BLOOD BY AUTOMATED COUNT: 14.3 % (ref 11.5–14.5)
HCT VFR BLD AUTO: 39.4 % (ref 35–47)
HGB BLD-MCNC: 12.6 G/DL (ref 11.5–16)
MCH RBC QN AUTO: 28.8 PG (ref 26–34)
MCHC RBC AUTO-ENTMCNC: 32 G/DL (ref 30–36.5)
MCV RBC AUTO: 90.2 FL (ref 80–99)
NRBC # BLD: 0 K/UL (ref 0–0.01)
NRBC BLD-RTO: 0 PER 100 WBC
PLATELET # BLD AUTO: 179 K/UL (ref 150–400)
RBC # BLD AUTO: 4.37 M/UL (ref 3.8–5.2)
WBC # BLD AUTO: 8.3 K/UL (ref 3.6–11)

## 2021-09-29 PROCEDURE — 0502F SUBSEQUENT PRENATAL CARE: CPT | Performed by: STUDENT IN AN ORGANIZED HEALTH CARE EDUCATION/TRAINING PROGRAM
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
## 2021-09-29 NOTE — LETTER
NOTIFICATION     9/29/2021 10:25 AM    Ms. Jessica Hernandez  [address] [phone]      To Whom It May Concern:    Jessica Hernandez is currently under the care of [facility]. She will return to work/school on: If there are questions or concerns please have the patient contact our office.         Sincerely,      Park Ramesh MD
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
## 2021-09-29 NOTE — PATIENT INSTRUCTIONS
Semanas 34 a 36 de bowman embarazo: Instrucciones de cuidado  Weeks 34 to 36 of Your Pregnancy: Care Instructions  Instrucciones de cuidado    A estas [place], bowman bebé y bowman abdomen habrán crecido considerablemente. Evelyn es [place] de l[place]h a savanna. Los pulmones de bowman bebé están evelyn listos para respirar aire. Los huesos de la ben de bowman bebé ahora son bastante firmes yoel para protegerla ileana se mantienen lo suficientemente blandos yoel para atravesar el canal de [place]. Es posible que sienta entusiasmo, caroline, ansiedad o miedo. Quizá se pregunte cómo se dará cuenta de si está en trabajo de parto o qué esperar en real momento. Trate de ser flexible con roxana expectativas respecto del nacimiento. Dado que cada nacimiento es diferente, no hay manera de saber exactamente cómo será bowman parto. Esta hoja de cuidados la ayudará a saber qué esperar y cómo prepararse. Le podría facilitar el parto. Si todavía no le valdez aplicado la vacuna Tdap (tétanos, difteria y tos [place]) anamaria jason [place], hable con bowman médico acerca de aplicársela. Cj [place] a proteger a bowman recién nacido contra la infección por tos ferina. En la semana 36, a la mayoría de las mujeres se les hace wei prueba de estreptococos del triny B (GBS, por roxana siglas en inglés). Los estreptococos del triny B son bacterias comunes que pueden vivir en la vagina y el recto. Pueden hacer que bowman bebé se enferme después del parto. Si el resultado es positivo, usted recibirá antibióticos anamaria el trabajo de [place]. Los medicamentos evitarán que bowman bebé contraiga las bacterias. La atención de seguimiento es wei parte clave de bowman tratamiento y seguridad. Asegúrese de hacer y acudir a todas las citas, y llame a bowman médico si está teniendo problemas. También es wei buena idea saber los resultados de roxana exámenes y mantener wei lista de los medicamentos que [place]. ¿Cómo puede cuidarse en el hogar?   Aprenda sobre las alternativas para aliviar el dolor  · El dolor se [place] de modo diferente en cada shellie. Hable con bowman médico acerca de roxana sentimientos sobre el dolor. · Puede elegir entre varias formas de aliviar el dolor. Estas incluyen medicamentos o técnicas de respiración, así yoel medidas para estar cómoda. Usted puede utilizar más de [place] opción. · Si elige un analgésico (medicamento para el dolor) anamaria el trabajo de [place], hable con bowman médico acerca de roxana opciones. Algunos medicamentos reducen la ansiedad y [facility] a aliviar parte del dolor. Otros adormecen la parte inferior del cuerpo para que no sienta dolor. · Asegúrese de decirle a bowman médico acerca de bowman elección de analgésico antes de empezar el trabajo de parto o muy temprano en el [place]. Es posible que pueda cambiar de parecer a medida que avanza el [place]. · Cassidy vez se duerme a wei shellie con medicamentos administrados a través de wei máscara o por vía intravenosa (IV). Trabajo de parto y [place]  · La primera etapa del [place] de parto se divide en leoncio fases: [place]  y de transición. ? La mayoría de las mujeres experimentan la fase latente del [place] de parto en roxana hogares. Usted puede [employer] o descansar, comer refrigerios livianos, beber líquidos hernesto y comenzar a contar las contracciones. ? Cuando advierta que se le vuelve difícil hablar anamaria wei contracción, es posible que esté por pasar a la fase activa. Anamaria la fase Junious Amass, debería ir al hospital si no está allí aún. ? Usted está en la fase activa cuando tiene contracciones cada 3 o 4 minutos y pa alrededor de 60 segundos. El digna uterino comienza a abrirse con más rapidez.  ? Si se le rompe la hayes, las contracciones serán más intensas y más frecuentes. ? Anamaria la fase de transición, el digna uterino se estira y las contracciones se producen con Audria Colder. ? Romeo Magyar tenga deseos de pujar, sin embargo es posible que el digna uterino aún no esté preparado.  El [employer] dirá cuándo pujar.  · La segunda etapa comienza cuando el digna uterino se abre por completo y usted está lista para pujar. ? Las contracciones son muy intensas a fin de empujar al bebé por el canal de parto. ? Sentirá la necesidad de pujar. Podría sentir yoel si tuviera ganas de evacuar el intestino. ? [place] Bonier entrenen a [employer]. Estas contracciones serán muy intensas ileana no ocurrirán con tanta frecuencia. Puede descansar un poco entre contracciones. ? Es posible que esté sensible e irritable. Es posible que no se dé cuenta de lo que pasa a bowman alrededor. ? Un último esfuerzo y habrá nacido bowman bebé. · La tercera etapa ocurre cuando con unas cuantas contracciones más se expulsa la placenta. [place] puede durar 30 minutos o menos. · La cuarta etapa es la de recuperación. Es posible que se sienta abrumada con las emociones y exhausta ileana alerta. [place] es un buen momento para comenzar el amamantamiento. ¿Dónde puede encontrar más información en inglés? Vaya a [email or web address]  Alessia Tao [id number] en la búsqueda para aprender más acerca de \"Semanas 34 a 39 de bowman embarazo: Instrucciones de cuidado. \"  Revisado: 16 junio, 2021               Versión del contenido: 13.0  © 7281-3272 Healthwise, Incorporated. Las instrucciones de cuidado fueron adaptadas bajo licencia por [employer] (which disclaims liability or warranty for this information). Si usted tiene [place] afección médica o sobre estas instrucciones, siempre pregunte a bowman profesional de maliha. Healthwise, Incorporated niega toda garantía o responsabilidad por bowman uso de esta información.    24 Hour National Suicide Hotline  9-693-720-246-846-6844      [facility]:  24 hour crisis line: [phone]  Daytime number: [phone]  Psychiatry, counseling, case management, drug/alcohol addiction     [address] (Dr. Davian Starr): [email or web address]/  [phone] [facility]. [address], [address]  Phone: [phone] ([phone])  Fax: [phone]  Office Hours:  Mon: 10:00 am to 4:00 pm  Tue: 8:00 am to 6:00 pm  Wed-Thur: 8:00 am to 7:00 pm     [facility]: [email or web address]/  [place] ([phone]),  [place] ([phone])  [place] ([phone])  [facility] [facility] [address] ([phone])     [address] [facility]  Addiction/Substance abuse   [place]: [phone]  [place]: [address] [place] [address] [facility]: [facility].[employer].  [address] , [address] [place] [address], [place], [address] [place] [address]  Ph. [phone] Fax [phone]     [facility] Ted Moore: [email or web address]  [employer] ([phone])  Jaime Kent ([phone])

## 2021-09-29 NOTE — LETTER
NOTIFICATION    9/29/2021 10:26 AM    Ms. Marry Jordan  [address] [phone]      To Whom It May Concern:    Marry Jordan is currently under the care of [address] [employer]. She is currently pregnant and dealing with a lot of stress with the divorce and pregnancy. I feel it would be in the best interest of the patient and baby if all hearings could be postponed until after the pregnancy if possible. If there are questions or concerns please have the patient contact our office.         Sincerely,      Bing Wyatt MD

## 2021-09-29 NOTE — PROGRESS NOTES
Estimated Date of Delivery: 10/30/21  EFW 31st%  GDM  Follow up in 4 week or sooner if requiring insulin

## 2021-09-29 NOTE — PROGRESS NOTES
I reviewed with the resident the medical history and the resident's findings on the physical examination. I discussed with the resident the patient's diagnosis and concur with the plan. Log mostly at goal    29yo  @ 35w4d by LMP  1. IUP: RH pos, NIPT wnl, declines flu vax   2. A1DM: Discussed risks of uncontrolled glucose including but not limited to macrosomia, birth injury, increased need for , birth defects, miscarriage/fetal demise,  hypoglycemia, persistent diabetes after pregnancy. Seen by DM ed. Glucoses at goal verbally, counseled to bring log to next visit. Growth scan scheduled. EFW 31st% on 9/15. 3.  Abnormal pap: colpo ok in pregnancy, repeat when PP   4. Varicose veins: counseled normal in pregnancy   5.   Obesity
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
## 2021-10-06 ENCOUNTER — ROUTINE PRENATAL (OUTPATIENT)
Dept: FAMILY MEDICINE CLINIC | Age: 31
End: 2021-10-06
Payer: MEDICAID

## 2021-10-06 VITALS
WEIGHT: 185 LBS | OXYGEN SATURATION: 98 % | DIASTOLIC BLOOD PRESSURE: 71 MMHG | BODY MASS INDEX: 32.78 KG/M2 | SYSTOLIC BLOOD PRESSURE: 107 MMHG | HEART RATE: 66 BPM | TEMPERATURE: 98.5 F | HEIGHT: 63 IN

## 2021-10-06 DIAGNOSIS — O24.410 DIET CONTROLLED GESTATIONAL DIABETES MELLITUS (GDM), ANTEPARTUM: ICD-10-CM

## 2021-10-06 DIAGNOSIS — Z3A.36 36 WEEKS GESTATION OF PREGNANCY: Primary | ICD-10-CM

## 2021-10-06 DIAGNOSIS — R82.71 ASYMPTOMATIC BACTERIURIA DURING PREGNANCY: ICD-10-CM

## 2021-10-06 DIAGNOSIS — O99.891 ASYMPTOMATIC BACTERIURIA DURING PREGNANCY: ICD-10-CM

## 2021-10-06 DIAGNOSIS — K21.9 GASTROESOPHAGEAL REFLUX DISEASE, UNSPECIFIED WHETHER ESOPHAGITIS PRESENT: ICD-10-CM

## 2021-10-06 LAB
GLUCOSE DOSE-GTT, POCT, GLDSPOCT: 70
GRBS, EXTERNAL: NORMAL

## 2021-10-06 PROCEDURE — 82950 GLUCOSE TEST: CPT | Performed by: STUDENT IN AN ORGANIZED HEALTH CARE EDUCATION/TRAINING PROGRAM

## 2021-10-06 PROCEDURE — 0502F SUBSEQUENT PRENATAL CARE: CPT | Performed by: STUDENT IN AN ORGANIZED HEALTH CARE EDUCATION/TRAINING PROGRAM

## 2021-10-06 NOTE — PATIENT INSTRUCTIONS
Semanas 34 a 36 de bowman embarazo: Instrucciones de cuidado  Weeks 34 to 36 of Your Pregnancy: Care Instructions  Instrucciones de cuidado    A estas [place], bowman bebé y bowman abdomen habrán crecido considerablemente. Evelyn es [place] de lokesh a savanna. Los pulmones de bowman bebé están evelyn listos para respirar aire. Los huesos de la ben de bowman bebé ahora son bastante firmes yoel para protegerla ileana se mantienen lo suficientemente blandos yoel para atravesar el canal de [place]. Es posible que sienta entusiasmo, caroline, ansiedad o miedo. Quizá se pregunte cómo se dará cuenta de si está en trabajo de parto o qué esperar en real momento. Trate de ser flexible con roxana expectativas respecto del nacimiento. Dado que cada nacimiento es diferente, no hay manera de saber exactamente cómo será bowman parto. Esta hoja de cuidados la ayudará a saber qué esperar y cómo prepararse. Le podría facilitar el parto. Si todavía no le valdez aplicado la vacuna Tdap (tétanos, difteria y tos [place]) anamaria jason [place], hable con bowman médico acerca de aplicársela. Peder Heap a proteger a bowman recién nacido contra la infección por tos ferina. En la semana 36, a la mayoría de las mujeres se les hace wei prueba de estreptococos del triny B (GBS, por roxana siglas en inglés). Los estreptococos del triny B son bacterias comunes que pueden vivir en la vagina y el recto. Pueden hacer que bowman bebé se enferme después del parto. Si el resultado es positivo, usted recibirá antibióticos anamaria el trabajo de [place]. Los medicamentos evitarán que bowman bebé contraiga las bacterias. La atención de seguimiento es wei parte clave de bowman tratamiento y seguridad. Asegúrese de hacer y acudir a todas las citas, y llame a bowman médico si está teniendo problemas. También es wei buena idea saber los resultados de roxana exámenes y mantener wei lista de los medicamentos que [place]. ¿Cómo puede cuidarse en el hogar?   Aprenda sobre las alternativas para aliviar el dolor  · El dolor se [place] de modo diferente en cada shellie. Hable con bowman médico acerca de roxana sentimientos sobre el dolor. · Puede elegir entre varias formas de aliviar el dolor. Estas incluyen medicamentos o técnicas de respiración, así yoel medidas para estar cómoda. Usted puede utilizar más de [place] opción. · Si elige un analgésico (medicamento para el dolor) anamaria el trabajo de [place], hable con bowman médico acerca de roxana opciones. Algunos medicamentos reducen la ansiedad y [facility] a aliviar parte del dolor. Otros adormecen la parte inferior del cuerpo para que no sienta dolor. · Asegúrese de decirle a bowman médico acerca de bowman elección de analgésico antes de empezar el trabajo de parto o muy temprano en el Sally Bile de [place]. Es posible que pueda cambiar de parecer a medida que avanza el Sally Bile de [place]. · Cassidy vez se duerme a wei shellie con medicamentos administrados a través de wei máscara o por vía intravenosa (IV). Trabajo de parto y [place]  · La primera etapa del Sally Bile de parto se divide en leoncio fases: [place] y de transición. ? La mayoría de las mujeres experimentan la fase latente del Sally Bile de parto en roxana hogares. Usted puede [employer] o descansar, comer refrigerios livianos, beber líquidos hernesto y comenzar a contar las contracciones. ? Cuando advierta que se le vuelve difícil hablar anamaria wei contracción, es posible que esté por pasar a la fase activa. Anamaria la fase Alveta Speller, debería ir al hospital si no está allí aún. ? Usted está en la fase activa cuando tiene contracciones cada 3 o 4 minutos y pa alrededor de 60 segundos. El digna uterino comienza a abrirse con más rapidez.  ? Si se le rompe la hayes, las contracciones serán más intensas y más frecuentes. ? Anamaria la fase de transición, el digna uterino se estira y las contracciones se producen con Prakash Later. ? Cali Apa tenga deseos de pujar, sin embargo es posible que el digna uterino aún no esté preparado.  El [employer] dirá cuándo pujar.  · La segunda etapa comienza cuando el digna uterino se abre por completo y usted está lista para pujar. ? Las contracciones son muy intensas a fin de empujar al bebé por el canal de parto. ? Sentirá la necesidad de pujar. Podría sentir yoel si tuviera ganas de evacuar el intestino. ? Juniata Shy entrenen a [employer]. Estas contracciones serán muy intensas ileana no ocurrirán con tanta frecuencia. Puede descansar un poco entre contracciones. ? Es posible que esté sensible e irritable. Es posible que no se dé cuenta de lo que pasa a bowman alrededor. ? Un último esfuerzo y habrá nacido bowman bebé. · La tercera etapa ocurre cuando con unas cuantas contracciones más se expulsa la placenta. [place] puede durar 30 minutos o menos. · La cuarta etapa es la de recuperación. Es posible que se sienta abrumada con las emociones y exhausta ileana alerta. [place] es un buen momento para comenzar el amamantamiento. ¿Dónde puede encontrar más información en inglés? Vaya a http://www.fernandez.com/  Jon Row [id number] en la búsqueda para aprender más acerca de \"Semanas 34 a 39 de bowman embarazo: Instrucciones de cuidado. \"  Revisado: 16 junio, 2021               Versión del contenido: 13.0  © 4771-2965 Healthwise, Incorporated. Las instrucciones de cuidado fueron adaptadas bajo licencia por [employer] (which disclaims liability or warranty for this information). Si usted tiene [place] afección médica o sobre estas instrucciones, siempre pregunte a bowman profesional de maliha. [employer] niega toda garantía o responsabilidad por bowman uso de esta información.

## 2021-10-06 NOTE — PROGRESS NOTES
Chief Complaint   Patient presents with    Routine Prenatal Visit     36w4d. NO LOF, no bleeding. +fetal movement. No ravi petersen      Visit Vitals  /71 (BP 1 Location: Left upper arm, BP Patient Position: Sitting)   Pulse 66   Temp 98.5 °F (36.9 °C) (Oral)   Ht 5' 3.39\" (1.61 m)   Wt 185 lb (83.9 kg)   SpO2 98%   BMI 32.37 kg/m²     1. Have you been to the ER, urgent care clinic since your last visit? Hospitalized since your last visit? No    2. Have you seen or consulted any other health care providers outside of the [address] [place] [address] since your last visit? Include any pap smears or colon screening.  No

## 2021-10-06 NOTE — PROGRESS NOTES
I reviewed with the resident the medical history and the resident's findings on the physical examination. I discussed with the resident the patient's diagnosis and concur with the plan. Log mostly at goal    29yo  @ 36w4d by LMP  1. IUP: RH pos, NIPT wnl, declines flu vax, GBS collected, cephalic by sono   2. A1DM: Discussed risks of uncontrolled glucose including but not limited to macrosomia, birth injury, increased need for , birth defects, miscarriage/fetal demise,  hypoglycemia, persistent diabetes after pregnancy. Seen by DM ed. Glucoses at goal verbally, counseled to bring log to next visit. Growth scan scheduled. EFW 31st% on 9/15. 3.  Abnormal pap: colpo ok in pregnancy, repeat when PP   4. Varicose veins: counseled normal in pregnancy   5.   Obesity

## 2021-10-09 LAB
BACTERIA SPEC CULT: NORMAL
SERVICE CMNT-IMP: NORMAL

## 2021-10-11 ENCOUNTER — ROUTINE PRENATAL (OUTPATIENT)
Dept: FAMILY MEDICINE CLINIC | Age: 31
End: 2021-10-11
Payer: MEDICAID

## 2021-10-11 VITALS
WEIGHT: 185 LBS | DIASTOLIC BLOOD PRESSURE: 66 MMHG | SYSTOLIC BLOOD PRESSURE: 100 MMHG | HEART RATE: 83 BPM | BODY MASS INDEX: 32.78 KG/M2 | OXYGEN SATURATION: 98 % | HEIGHT: 63 IN | TEMPERATURE: 98 F

## 2021-10-11 DIAGNOSIS — K21.9 GASTROESOPHAGEAL REFLUX DISEASE, UNSPECIFIED WHETHER ESOPHAGITIS PRESENT: ICD-10-CM

## 2021-10-11 DIAGNOSIS — F32.A DEPRESSION AFFECTING PREGNANCY: ICD-10-CM

## 2021-10-11 DIAGNOSIS — O24.410 DIET CONTROLLED GESTATIONAL DIABETES MELLITUS (GDM), ANTEPARTUM: ICD-10-CM

## 2021-10-11 DIAGNOSIS — O99.340 DEPRESSION AFFECTING PREGNANCY: ICD-10-CM

## 2021-10-11 DIAGNOSIS — Z3A.37 37 WEEKS GESTATION OF PREGNANCY: Primary | ICD-10-CM

## 2021-10-11 DIAGNOSIS — B97.7: ICD-10-CM

## 2021-10-11 DIAGNOSIS — I83.811 VARICOSE VEINS OF RIGHT LOWER EXTREMITY WITH PAIN: ICD-10-CM

## 2021-10-11 DIAGNOSIS — O99.210 OBESITY AFFECTING PREGNANCY, ANTEPARTUM: ICD-10-CM

## 2021-10-11 DIAGNOSIS — O98.313: ICD-10-CM

## 2021-10-11 PROCEDURE — 0502F SUBSEQUENT PRENATAL CARE: CPT | Performed by: STUDENT IN AN ORGANIZED HEALTH CARE EDUCATION/TRAINING PROGRAM

## 2021-10-11 NOTE — PROGRESS NOTES
Identified Patient with two Patient identifiers (Name and ). Two Patient Identifiers confirmed. Reviewed record in preparation for visit and have obtained necessary documentation. Patient is 37w2d      LEAKAGE OF FLUID: No  BLEEDING: No  FETAL MOVEMENT: Yes  JESSIKA COLLINS CONTRACTIONS:No  PRENATAL VITAMINS: Yes  PAIN: No    Chief Complaint   Patient presents with    Routine Prenatal Visit       Visit Vitals  /66 (BP 1 Location: Left upper arm, BP Patient Position: Sitting)   Pulse 83   Temp 98 °F (36.7 °C) (Oral)   Ht 5' 3.39\" (1.61 m)   Wt 185 lb (83.9 kg)   SpO2 98%   BMI 32.37 kg/m²       1. Have you been to the ER, urgent care clinic since your last visit? Hospitalized since your last visit? No    2. Have you seen or consulted any other health care providers outside of the [address] [place] [address] since your last visit? Include any pap smears or colon screening.  No

## 2021-10-11 NOTE — PROGRESS NOTES
I reviewed with the resident the medical history and the resident's findings on the physical examination. I discussed with the resident the patient's diagnosis and concur with the plan. Mostly at goal but sounds not consistently checking    29yo  @ 37w2d by LMP  1. IUP: RH pos, NIPT wnl, declines flu vax, GBS collected, cephalic by sono   2. A1DM: Discussed risks of uncontrolled glucose including but not limited to macrosomia, birth injury, increased need for , birth defects, miscarriage/fetal demise,  hypoglycemia, persistent diabetes after pregnancy. Seen by DM ed. EFW 31st% on 9/15, has repeat on Wed. 3.  Abnormal pap: colpo ok in pregnancy, repeat when PP   4. Varicose veins: counseled normal in pregnancy   5.   Obesity
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
## 2021-10-13 ENCOUNTER — HOSPITAL ENCOUNTER (OUTPATIENT)
Dept: PERINATAL CARE | Age: 31
Discharge: HOME | End: 2021-10-13
Attending: OBSTETRICS & GYNECOLOGY
Payer: MEDICAID

## 2021-10-13 PROCEDURE — 76820 UMBILICAL ARTERY ECHO: CPT | Performed by: OBSTETRICS & GYNECOLOGY

## 2021-10-13 PROCEDURE — 76819 FETAL BIOPHYS PROFIL W/O NST: CPT | Performed by: OBSTETRICS & GYNECOLOGY

## 2021-10-13 PROCEDURE — 76816 OB US FOLLOW-UP PER FETUS: CPT | Performed by: OBSTETRICS & GYNECOLOGY

## 2021-10-18 ENCOUNTER — ROUTINE PRENATAL (OUTPATIENT)
Dept: FAMILY MEDICINE CLINIC | Age: 31
End: 2021-10-18
Payer: MEDICAID

## 2021-10-18 VITALS
BODY MASS INDEX: 32.57 KG/M2 | HEART RATE: 85 BPM | OXYGEN SATURATION: 98 % | WEIGHT: 183.8 LBS | TEMPERATURE: 98 F | HEIGHT: 63 IN | DIASTOLIC BLOOD PRESSURE: 73 MMHG | SYSTOLIC BLOOD PRESSURE: 115 MMHG

## 2021-10-18 DIAGNOSIS — O24.410 DIET CONTROLLED GESTATIONAL DIABETES MELLITUS (GDM), ANTEPARTUM: ICD-10-CM

## 2021-10-18 DIAGNOSIS — F32.A DEPRESSION AFFECTING PREGNANCY: ICD-10-CM

## 2021-10-18 DIAGNOSIS — O99.210 OBESITY AFFECTING PREGNANCY, ANTEPARTUM: ICD-10-CM

## 2021-10-18 DIAGNOSIS — Z3A.38 38 WEEKS GESTATION OF PREGNANCY: Primary | ICD-10-CM

## 2021-10-18 DIAGNOSIS — O98.312: ICD-10-CM

## 2021-10-18 DIAGNOSIS — I83.811 VARICOSE VEINS OF RIGHT LOWER EXTREMITY WITH PAIN: ICD-10-CM

## 2021-10-18 DIAGNOSIS — K21.9 GASTROESOPHAGEAL REFLUX DISEASE, UNSPECIFIED WHETHER ESOPHAGITIS PRESENT: ICD-10-CM

## 2021-10-18 DIAGNOSIS — B97.7: ICD-10-CM

## 2021-10-18 DIAGNOSIS — O99.340 DEPRESSION AFFECTING PREGNANCY: ICD-10-CM

## 2021-10-18 LAB — GLUCOSE DOSE-GTT, POCT, GLDSPOCT: 63

## 2021-10-18 PROCEDURE — 0502F SUBSEQUENT PRENATAL CARE: CPT | Performed by: STUDENT IN AN ORGANIZED HEALTH CARE EDUCATION/TRAINING PROGRAM

## 2021-10-18 PROCEDURE — 82950 GLUCOSE TEST: CPT | Performed by: STUDENT IN AN ORGANIZED HEALTH CARE EDUCATION/TRAINING PROGRAM

## 2021-10-18 NOTE — PROGRESS NOTES
I reviewed with the resident the medical history and the resident's findings on the physical examination. I discussed with the resident the patient's diagnosis and concur with the plan. Mostly at goal but sounds not consistently checking    29yo  @ 38w2d by LMP  1. IUP: RH pos, NIPT wnl, declines flu vax, GBS neg, cephalic by sono, Covid collected   2. A1DM: Discussed risks of uncontrolled glucose including but not limited to macrosomia, birth injury, increased need for , birth defects, miscarriage/fetal demise,  hypoglycemia, persistent diabetes after pregnancy. Seen by DM ed. EFW 21st% on 10/13 (AC borderline low). IOL scheduled for 10/26 (39w3d). 3.  Abnormal pap: colpo ok in pregnancy, repeat when PP   4. Varicose veins: counseled normal in pregnancy, compression stockings, elevation   5.   Obesity    Estimated Date of Delivery: 10/30/21

## 2021-10-18 NOTE — PROGRESS NOTES
Identified Patient with two Patient identifiers (Name and ). Two Patient Identifiers confirmed. Reviewed record in preparation for visit and have obtained necessary documentation. Patient is 38w2d      LEAKAGE OF FLUID: No  BLEEDING: No  FETAL MOVEMENT: YES  JESSIKA COLLINS CONTRACTIONS:YES  PRENATAL VITAMINS: No  PAIN: No    Chief Complaint   Patient presents with    Routine Prenatal Visit       Visit Vitals  /73 (BP 1 Location: Left upper arm, BP Patient Position: Sitting)   Pulse 85   Temp 98 °F (36.7 °C) (Oral)   Ht 5' 3.39\" (1.61 m)   Wt 183 lb 12.8 oz (83.4 kg)   SpO2 98%   BMI 32.16 kg/m²       1. Have you been to the ER, urgent care clinic since your last visit? Hospitalized since your last visit?no  2. Have you seen or consulted any other health care providers outside of the [address] [place] [address] since your last visit? Include any pap smears or colon screening.  No
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
## 2021-10-20 ENCOUNTER — HOSPITAL ENCOUNTER (OUTPATIENT)
Dept: PERINATAL CARE | Age: 31
Discharge: HOME | End: 2021-10-20
Attending: OBSTETRICS & GYNECOLOGY
Payer: MEDICAID

## 2021-10-20 LAB
SARS-COV-2, NAA 2 DAY TAT: NORMAL
SARS-COV-2, NAA: NOT DETECTED

## 2021-10-20 PROCEDURE — 76820 UMBILICAL ARTERY ECHO: CPT | Performed by: OBSTETRICS & GYNECOLOGY

## 2021-10-20 PROCEDURE — 76819 FETAL BIOPHYS PROFIL W/O NST: CPT | Performed by: OBSTETRICS & GYNECOLOGY

## 2021-10-25 ENCOUNTER — HOSPITAL ENCOUNTER (INPATIENT)
Age: 31
LOS: 2 days | Discharge: HOME | DRG: 560 | End: 2021-10-27
Attending: FAMILY MEDICINE | Admitting: OBSTETRICS & GYNECOLOGY
Payer: MEDICAID

## 2021-10-25 PROBLEM — Z3A.39 PREGNANCY WITH 39 COMPLETED WEEKS GESTATION: Status: ACTIVE | Noted: 2021-10-25

## 2021-10-25 LAB
ALBUMIN SERPL-MCNC: 2.4 G/DL (ref 3.5–5)
ALBUMIN/GLOB SERPL: 0.6 {RATIO} (ref 1.1–2.2)
ALP SERPL-CCNC: 185 U/L (ref 45–117)
ALT SERPL-CCNC: 15 U/L (ref 12–78)
ANION GAP SERPL CALC-SCNC: 9 MMOL/L (ref 5–15)
AST SERPL-CCNC: 14 U/L (ref 15–37)
BASOPHILS # BLD: 0 K/UL (ref 0–0.1)
BASOPHILS NFR BLD: 0 % (ref 0–1)
BILIRUB SERPL-MCNC: 0.2 MG/DL (ref 0.2–1)
BUN SERPL-MCNC: 14 MG/DL (ref 6–20)
BUN/CREAT SERPL: 19 (ref 12–20)
CALCIUM SERPL-MCNC: 8.7 MG/DL (ref 8.5–10.1)
CHLORIDE SERPL-SCNC: 108 MMOL/L (ref 97–108)
CO2 SERPL-SCNC: 19 MMOL/L (ref 21–32)
CREAT SERPL-MCNC: 0.75 MG/DL (ref 0.55–1.02)
DIFFERENTIAL METHOD BLD: ABNORMAL
EOSINOPHIL # BLD: 0.1 K/UL (ref 0–0.4)
EOSINOPHIL NFR BLD: 1 % (ref 0–7)
ERYTHROCYTE [DISTWIDTH] IN BLOOD BY AUTOMATED COUNT: 14.4 % (ref 11.5–14.5)
GLOBULIN SER CALC-MCNC: 4.2 G/DL (ref 2–4)
GLUCOSE BLD STRIP.AUTO-MCNC: 109 MG/DL (ref 65–117)
GLUCOSE SERPL-MCNC: 76 MG/DL (ref 65–100)
HCT VFR BLD AUTO: 36.9 % (ref 35–47)
HGB BLD-MCNC: 12.3 G/DL (ref 11.5–16)
IMM GRANULOCYTES # BLD AUTO: 0 K/UL (ref 0–0.04)
IMM GRANULOCYTES NFR BLD AUTO: 1 % (ref 0–0.5)
LYMPHOCYTES # BLD: 1.3 K/UL (ref 0.8–3.5)
LYMPHOCYTES NFR BLD: 16 % (ref 12–49)
MCH RBC QN AUTO: 28.7 PG (ref 26–34)
MCHC RBC AUTO-ENTMCNC: 33.3 G/DL (ref 30–36.5)
MCV RBC AUTO: 86.2 FL (ref 80–99)
MONOCYTES # BLD: 0.6 K/UL (ref 0–1)
MONOCYTES NFR BLD: 7 % (ref 5–13)
NEUTS SEG # BLD: 6.1 K/UL (ref 1.8–8)
NEUTS SEG NFR BLD: 75 % (ref 32–75)
NRBC # BLD: 0 K/UL (ref 0–0.01)
NRBC BLD-RTO: 0 PER 100 WBC
PLATELET # BLD AUTO: 155 K/UL (ref 150–400)
POTASSIUM SERPL-SCNC: 4.3 MMOL/L (ref 3.5–5.1)
PROT SERPL-MCNC: 6.6 G/DL (ref 6.4–8.2)
RBC # BLD AUTO: 4.28 M/UL (ref 3.8–5.2)
SERVICE CMNT-IMP: NORMAL
SODIUM SERPL-SCNC: 136 MMOL/L (ref 136–145)
WBC # BLD AUTO: 8.1 K/UL (ref 3.6–11)

## 2021-10-25 PROCEDURE — 59200 INSERT CERVICAL DILATOR: CPT | Performed by: OBSTETRICS & GYNECOLOGY

## 2021-10-25 PROCEDURE — 2709999900 HC NON-CHARGEABLE SUPPLY

## 2021-10-25 PROCEDURE — 77030028565 HC CATH CERV RIPNG BLN COOK -B

## 2021-10-25 PROCEDURE — 85025 COMPLETE CBC W/AUTO DIFF WBC: CPT

## 2021-10-25 PROCEDURE — 36415 COLL VENOUS BLD VENIPUNCTURE: CPT

## 2021-10-25 PROCEDURE — 75410000002 HC LABOR FEE PER 1 HR: Performed by: OBSTETRICS & GYNECOLOGY

## 2021-10-25 PROCEDURE — 65270000029 HC RM PRIVATE

## 2021-10-25 PROCEDURE — 80053 COMPREHEN METABOLIC PANEL: CPT

## 2021-10-25 PROCEDURE — 82962 GLUCOSE BLOOD TEST: CPT

## 2021-10-25 RX ORDER — SODIUM CHLORIDE 0.9 % (FLUSH) 0.9 %
5-40 SYRINGE (ML) INJECTION EVERY 8 HOURS
Status: DISCONTINUED | OUTPATIENT
Start: 2021-10-25 | End: 2021-10-27 | Stop reason: HOSPADM

## 2021-10-25 RX ORDER — SODIUM CHLORIDE, SODIUM LACTATE, POTASSIUM CHLORIDE, CALCIUM CHLORIDE 600; 310; 30; 20 MG/100ML; MG/100ML; MG/100ML; MG/100ML
125 INJECTION, SOLUTION INTRAVENOUS CONTINUOUS
Status: DISCONTINUED | OUTPATIENT
Start: 2021-10-26 | End: 2021-10-27 | Stop reason: HOSPADM

## 2021-10-25 RX ORDER — SODIUM CHLORIDE, SODIUM LACTATE, POTASSIUM CHLORIDE, CALCIUM CHLORIDE 600; 310; 30; 20 MG/100ML; MG/100ML; MG/100ML; MG/100ML
125 INJECTION, SOLUTION INTRAVENOUS CONTINUOUS
Status: DISCONTINUED | OUTPATIENT
Start: 2021-10-25 | End: 2021-10-25

## 2021-10-25 RX ORDER — OXYTOCIN/RINGER'S LACTATE 30/500 ML
0-20 PLASTIC BAG, INJECTION (ML) INTRAVENOUS
Status: DISCONTINUED | OUTPATIENT
Start: 2021-10-26 | End: 2021-10-27 | Stop reason: HOSPADM

## 2021-10-25 RX ORDER — ACETAMINOPHEN 500 MG
500 TABLET ORAL
Status: DISCONTINUED | OUTPATIENT
Start: 2021-10-25 | End: 2021-10-27 | Stop reason: HOSPADM

## 2021-10-25 RX ORDER — OXYTOCIN/RINGER'S LACTATE 30/500 ML
10 PLASTIC BAG, INJECTION (ML) INTRAVENOUS AS NEEDED
Status: DISCONTINUED | OUTPATIENT
Start: 2021-10-25 | End: 2021-10-27 | Stop reason: HOSPADM

## 2021-10-25 RX ORDER — OXYTOCIN/RINGER'S LACTATE 30/500 ML
87.3 PLASTIC BAG, INJECTION (ML) INTRAVENOUS AS NEEDED
Status: COMPLETED | OUTPATIENT
Start: 2021-10-25 | End: 2021-10-26

## 2021-10-25 RX ORDER — SODIUM CHLORIDE 0.9 % (FLUSH) 0.9 %
5-40 SYRINGE (ML) INJECTION AS NEEDED
Status: DISCONTINUED | OUTPATIENT
Start: 2021-10-25 | End: 2021-10-27 | Stop reason: HOSPADM
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
## 2021-10-25 NOTE — PROGRESS NOTES
1600: Pt arrived to L&D room 203 for IOL. 1604: Dr. Yarelis Harkins at bedside using [address] [place] [address] [phone] to assess pt and discuss POC.    2105: Dr. Yarelis Harkins and Dr. Kyung Holder at bedside for cook cath placement. SVE per MD, 1.5 cm. Cook cath placed per MD, 60 cc sterile water injected in both uterine and vaginal balloons.  remains online via [employer], [facility] RN using  for admission and assessment. Opportunity for questions provided. 1900: Bedside and Verbal shift change report given to REJI Lundberg RN (oncoming nurse) by KATY Barraza (offgoing nurse). Report included the following information SBAR, Kardex, Procedure Summary, Intake/Output, MAR, Accordion, Recent Results and Med Rec Status.

## 2021-10-25 NOTE — PROGRESS NOTES
Problem: Vaginal Delivery: Day of Deliver-Laboring  Goal: Activity/Safety  Outcome: Progressing Towards Goal     Problem: Vaginal Delivery: Day of Deliver-Laboring  Goal: Consults, if ordered  Outcome: Progressing Towards Goal     Problem: Vaginal Delivery: Day of Deliver-Laboring  Goal: Nutrition/Diet  Outcome: Progressing Towards Goal     Problem: Vaginal Delivery: Day of Deliver-Laboring  Goal: Medications  Outcome: Progressing Towards Goal

## 2021-10-26 LAB
GLUCOSE BLD STRIP.AUTO-MCNC: 84 MG/DL (ref 65–117)
SERVICE CMNT-IMP: NORMAL

## 2021-10-26 PROCEDURE — 74011250636 HC RX REV CODE- 250/636: Performed by: STUDENT IN AN ORGANIZED HEALTH CARE EDUCATION/TRAINING PROGRAM

## 2021-10-26 PROCEDURE — 74011250637 HC RX REV CODE- 250/637: Performed by: OBSTETRICS & GYNECOLOGY

## 2021-10-26 PROCEDURE — 82962 GLUCOSE BLOOD TEST: CPT

## 2021-10-26 PROCEDURE — 75410000000 HC DELIVERY VAGINAL/SINGLE: Performed by: OBSTETRICS & GYNECOLOGY

## 2021-10-26 PROCEDURE — 75410000003 HC RECOV DEL/VAG/CSECN EA 0.5 HR: Performed by: OBSTETRICS & GYNECOLOGY

## 2021-10-26 PROCEDURE — 77010026065 HC OXYGEN MINIMUM MEDICAL AIR: Performed by: OBSTETRICS & GYNECOLOGY

## 2021-10-26 PROCEDURE — 65270000029 HC RM PRIVATE

## 2021-10-26 PROCEDURE — 10907ZC DRAINAGE OF AMNIOTIC FLUID, THERAPEUTIC FROM PRODUCTS OF CONCEPTION, VIA NATURAL OR ARTIFICIAL OPENING: ICD-10-PCS | Performed by: OBSTETRICS & GYNECOLOGY

## 2021-10-26 PROCEDURE — 75410000002 HC LABOR FEE PER 1 HR: Performed by: OBSTETRICS & GYNECOLOGY

## 2021-10-26 PROCEDURE — 2709999900 HC NON-CHARGEABLE SUPPLY

## 2021-10-26 PROCEDURE — 76815 OB US LIMITED FETUS(S): CPT | Performed by: OBSTETRICS & GYNECOLOGY

## 2021-10-26 RX ORDER — ZOLPIDEM TARTRATE 5 MG/1
5 TABLET ORAL
Status: DISCONTINUED | OUTPATIENT
Start: 2021-10-26 | End: 2021-10-27 | Stop reason: HOSPADM

## 2021-10-26 RX ORDER — NALOXONE HYDROCHLORIDE 0.4 MG/ML
0.4 INJECTION, SOLUTION INTRAMUSCULAR; INTRAVENOUS; SUBCUTANEOUS AS NEEDED
Status: DISCONTINUED | OUTPATIENT
Start: 2021-10-26 | End: 2021-10-27 | Stop reason: HOSPADM

## 2021-10-26 RX ORDER — PEPPERMINT OIL
SPIRIT ORAL
Status: DISPENSED
Start: 2021-10-26 | End: 2021-10-26

## 2021-10-26 RX ORDER — IBUPROFEN 800 MG/1
800 TABLET ORAL EVERY 8 HOURS
Status: DISCONTINUED | OUTPATIENT
Start: 2021-10-26 | End: 2021-10-27 | Stop reason: HOSPADM

## 2021-10-26 RX ADMIN — IBUPROFEN 800 MG: 800 TABLET, FILM COATED ORAL at 15:47

## 2021-10-26 RX ADMIN — IBUPROFEN 800 MG: 800 TABLET, FILM COATED ORAL at 23:58

## 2021-10-26 RX ADMIN — OXYTOCIN 87.3 MILLI-UNITS/MIN: 10 INJECTION, SOLUTION INTRAMUSCULAR; INTRAVENOUS at 04:03

## 2021-10-26 RX ADMIN — SODIUM CHLORIDE, POTASSIUM CHLORIDE, SODIUM LACTATE AND CALCIUM CHLORIDE 500 ML: 600; 310; 30; 20 INJECTION, SOLUTION INTRAVENOUS at 00:50

## 2021-10-26 RX ADMIN — OXYTOCIN 87.3 MILLI-UNITS/MIN: 10 INJECTION, SOLUTION INTRAMUSCULAR; INTRAVENOUS at 05:07

## 2021-10-26 RX ADMIN — IBUPROFEN 800 MG: 800 TABLET, FILM COATED ORAL at 05:07

## 2021-10-26 NOTE — L&D DELIVERY NOTE
This patient was 30 or more weeks gestation at the time of [facility] go-live. For complete information pertaining to this patient's pregnancy, please refer to the paper chart and ACOG form. Delivery Note    Obstetrician:  Juan Carlos Dennison MD    Assistant: none    Pre-Delivery Diagnosis: Term pregnancy    Post-Delivery Diagnosis: Living  infant(s) and Male    Intrapartum Event: None    Procedure: Spontaneous vaginal delivery    Epidural: NO    Monitor:  Fetal Heart Tones - Internal and Uterine Contractions - External    Indications for instrumental delivery: none    Estimated Blood Loss: No data found    Episiotomy: n/a    Laceration(s):  none    Laceration(s) repair: NO    Presentation: Cephalic    Fetal Description: andrew    Fetal Position: Right Occiput Anterior    Birth Weight: pending    Birth Length: pending    Apgar - One Minute: 8    Apgar - Five Minutes: 9    Umbilical Cord: Nuchal Cord x  1    Specimens: placenta, discarded           Complications:  none           Cord Blood Results:   Information for the patient's :   Clarisse Li [[id number]]   No results found for: JAN Bailey     Prenatal Labs:     Lab Results   Component Value Date/Time    ABO/Rh(D) A POSITIVE 2021 04:25 PM    ABO,Rh A Positive 2021 12:00 AM    HBsAg, External negative 2021 12:00 AM    HIV, External non reactive 2021 12:00 AM    Rubella, External immune 2021 12:00 AM    RPR, External non reactive  2021 12:00 AM    Gonorrhea, External negative 2021 12:00 AM    Chlamydia, External negative 2021 12:00 AM    GrBStrep, External Negativr 10/06/2021 12:00 AM        Attending Attestation: I performed the procedure    Signed By:  Juan Carlos Dennison MD     2021

## 2021-10-26 NOTE — PROGRESS NOTES
Bedside and Verbal shift change report given to [address] [place] [address] (oncoming nurse) by Opal JUAREZ (offgoing nurse). Report included the following information SBAR, Kardex, Intake/Output, MAR and Recent Results.

## 2021-10-26 NOTE — PROGRESS NOTES
10/26/2021  11:18 AM    CM met with MARY to complete initial assessment and begin discharge planning. MOB verified and confirmed demographics. MARY lives with spouse/FOB- Paula Haro ( [phone]), along with her children ages: 16, 15, 6, and 8yr old, at the address on file. MARY does not work and plans to be home with infant. FOB is also employed and will be taking adequate time off. MARY reports she has good family support, and feels like she has the support she needs when she returns home. MARY plans to breast feed baby. Deidre Crissy will provide follow up care for infant. MARY has car seat, bassinet/crib, clothing, bottles and all necessary supplies for baby. MARY does not have insurance and is interested in applying for Medicaid for herself and infant. [employer] has met with MARY and assisted with application. MARY is also enrolled in [facility] services and understands how to add baby to program.  Care Management Interventions  PCP Verified by CM: Yes ([facility])  Mode of Transport at Discharge:  Other (see comment)  Transition of Care Consult (CM Consult): Discharge Planning  Support Systems: Other Family Member(s), Spouse/Significant Other  Confirm Follow Up Transport: Family  Discharge Location  Discharge Placement: Home with family assistance  Ryan Gold

## 2021-10-26 NOTE — LACTATION NOTE
This note was copied from a baby's chart. Mother nursing infant comfortably in cradle position. This is mothers fifth baby to breastfeed. Patient's daughter translated in the room. Mother feels comfortable feeding baby. Questions regarding frequency and duration of feedings answered. Breast pads, lanolin provided. Mother aware she can call if needing assistance with breastfeeding. Lithuanian booklet and Slovak warm line provided to patient. Discussed with mother her plan for feeding. Reviewed the benefits of exclusive breast milk feeding during the hospital stay. Informed her of the risks of using formula to supplement in the first few days of life as well as the benefits of successful breast milk feeding; referred her to the Breastfeeding booklet about this information. She acknowledges understanding of information reviewed and states that it is her plan to blend feed her infant. Will support her choice and offer additional information as needed. Reviewed breastfeeding basics:  How milk is made and normal  breastfeeding behaviors discussed. Supply and demand,  stomach size, early feeding cues, skin to skin bonding with comfortable positioning and baby led latch-on reviewed. How to identify signs of successful breastfeeding sessions reviewed; education on assymetrical latch, signs of effective latching vs shallow, in-effective latching, normal  feeding frequency and duration and expected infant output discussed. Normal course of breastfeeding discussed including the AAP's recommendation that children receive exclusive breast milk feedings for the first six months of life with breast milk feedings to continue through the first year of life and/or beyond as complimentary table foods are added. Breastfeeding Booklet and Warm line information provided with discussion.   Discussed typical  weight loss and the importance of pediatrician appointment within 24-48 hours of discharge, at 2 weeks of life and normalcy of requesting pediatric weight checks as needed in between visits. Pt chooses to do both breast and bottle. Discussed effects of early supplementation on breastfeeding success; may decrease breastmilk production and supply, increase risk for pathological engorgement, baby may develop preference for faster flow from bottles vs breast, and baby's stomach can be stretched if larger volumes of formula are given. Pt will successfully establish breastfeeding by feeding in response to early feeding cues   or wake every 3h, will obtain deep latch, and will keep log of feedings/output. Taught to BF at hunger cues and or q 2-3 hrs and to offer 10-20 drops of hand expressed colostrum at any non-feeds.       Breast Assessment  Left Breast: Medium  Left Nipple: Everted, Intact  Right Breast: Medium  Right Nipple: Everted, Intact  Breast- Feeding Assessment  Breast-Feeding Experience: Yes (5th baby to breastfeed)  Breast Trauma/Surgery: No  Type/Quality: Good  Lactation Consultant Visits  Breast-Feedings: Good   Mother/Infant Observation  Mother Observation: Breast comfortable, Lets baby end feeding, Nipple round on release  Infant Observation: Breast tissue moves, Lips flanged, lower, Lips flanged, upper, Opens mouth  LATCH Documentation  Latch: Grasps breast, tongue down, lips flanged, rhythmic sucking  Audible Swallowing: None  Type of Nipple: Everted (after stimulation)  Comfort (Breast/Nipple): Soft/non-tender  Hold (Positioning): No assist from staff, mother able to position/hold infant  LATCH Score: 8

## 2021-10-26 NOTE — PROGRESS NOTES
0710: SBAR report received from Michael Sanchez RN    0730: Pt ambulated to bathroom with assistance. Pt unable to void. 0830: Pt eating and holding baby    0900: Pt up to void. Voided 900cc. 0658: TRANSFER - OUT REPORT:    Verbal report given to BINDU Cruz RN(name) on [employer]  being transferred to [facility] 310(unit) for routine progression of care       Report consisted of patients Situation, Background, Assessment and   Recommendations(SBAR). Information from the following report(s) SBAR, Kardex, Intake/Output, MAR, Recent Results, Med Rec Status and Alarm Parameters  was reviewed with the receiving nurse. Lines:   Peripheral IV 10/25/21 Left;Posterior Hand (Active)   Site Assessment Clean, dry, & intact 10/26/21 0746   Phlebitis Assessment 0 10/26/21 0746   Infiltration Assessment 0 10/26/21 0746   Dressing Status Clean, dry, & intact 10/26/21 0746   Dressing Type Transparent;Tape 10/26/21 0746   Hub Color/Line Status Pink; Infusing 10/26/21 0746   Action Taken Open ports on tubing capped 10/26/21 0746        Opportunity for questions and clarification was provided.       Patient transported with:   Registered Nurse

## 2021-10-26 NOTE — PROGRESS NOTES
Problem: Vaginal Delivery: Day of Deliver-Laboring  Goal: Activity/Safety  10/26/2021 0438 by Guera Shannon. Outcome: Resolved/Met  10/25/2021 1941 by Guera Shannon Outcome: Progressing Towards Goal     Problem: Vaginal Delivery: Day of Deliver-Laboring  Goal: Nutrition/Diet  10/26/2021 0438 by Guera Shannon. Outcome: Resolved/Met  10/25/2021 1941 by Guera Shannon   Outcome: Progressing Towards Goal

## 2021-10-26 NOTE — PROGRESS NOTES
Called to see pt for deceleration. Pt had declaration for 3 minutes. COOK catheter removed, AROM performed for blood tinged fluid and FSE placed.       SVE: 4/50/-3  [place]: rare  FHT: Reactive post AROM with moderate variability, accels, no decel, 130s      A/ multip IOL for GDM    P/ continue to monitor closely

## 2021-10-26 NOTE — ROUTINE PROCESS
1900  Bedside and Verbal shift change report given to NAMITA Lundberg RN (oncoming nurse) by Cristela Ayala RN (offgoing nurse). Report included the following information SBAR, Kardex, Procedure Summary, MAR, Accordion, Recent Results and Med Rec Status. 1930  Patient assessment completed. Patient is turned and repositioned onto lateral left. [facility] charge RN called to assist.  0146  Dr Kita Matthews called to bedside to assess. 0038  SVE 4/50/-3  0040  SROM, large amount of blood tinged water  FSE placed. 0050  500 ml Fluid Bolus  Patient turned onto lateral right. 0122  Patient turned onto lateral left.  0205  Patient is attempting to use the bedpan, no success. 9254  Patient voids 300 ml.  0332  Patient is reporting an increase in pain and pressure. SVE 7/60/-3  Patient instructed to let RN =know if there is an urge to push. 4498  Patient call. Patient has the urge to push. 5389  SVE 10/100/+3   Dr Kita Matthews called to bedside for delivery. 9695  Patient repositioned into stirups, and has been educated on how to push.  0401  SVE  Viable male infant born. Infant placed onto maternal abdomen, dried and stimulated. No repairs needed. 0403  Placenta delivered. Pitocin wide open. 0600  Patient ambulates to bathroom, but unable to void. Jessica care, pads changed, gown changed.  ml + 45 ml = 145 ml  0710  Bedside and Verbal shift change report given to CONRAD Salas RN (oncoming nurse) by Melba David RN (offgoing nurse). Report included the following information SBAR, Kardex, Procedure Summary, Intake/Output, MAR, Accordion, Recent Results and Med Rec Status.

## 2021-10-26 NOTE — PROGRESS NOTES
[address] [facility] [address] [place] [address]   Office [phone], Fax [phone]                                Post-Partum Day 0 Brief Progress Note    Patient doing well post-partum without significant complaint. Pain well controlled. No concerns or questions when seen this morning. Vitals:  Patient Vitals for the past 8 hrs:   BP Temp Pulse Resp SpO2   10/26/21 0608 (!) 112/52 97.8 °F (36.6 °C) (!) 59 16    10/26/21 0452 114/74  73     10/26/21 0437 136/63  61     10/26/21 0408 112/71 98.6 °F (37 °C) 69 18    10/26/21 0357     100 %   10/26/21 0352     98 %   10/26/21 0347     98 %   10/26/21 0342     98 %   10/26/21 0337     100 %   10/26/21 0332     94 %   10/26/21 0327     99 %   10/26/21 0322     98 %   10/26/21 0317     98 %   10/26/21 0312     99 %   10/26/21 0307     98 %   10/26/21 0301     98 %   10/26/21 0256     98 %   10/26/21 0251     97 %   10/26/21 0246     98 %   10/26/21 0241     98 %   10/26/21 0232     98 %   10/26/21 0227     97 %   10/26/21 0222     98 %   10/26/21 0217     98 %   10/26/21 0212     98 %   10/26/21 0203     98 %   10/26/21 0158     98 %   10/26/21 0152     97 %   10/26/21 0147     98 %   10/26/21 0142     98 %   10/26/21 0137     98 %   10/26/21 0132     99 %   10/26/21 0127     99 %   10/26/21 0122     99 %   10/26/21 0117     98 %   10/26/21 0112     98 %   10/26/21 0107     98 %   10/26/21 0102 108/62 98.4 °F (36.9 °C) 66 18 97 %   10/26/21 0057     98 %   10/26/21 0045     99 %   10/26/21 0040     98 %     Temp (24hrs), Av.1 °F (36.7 °C), Min:97.7 °F (36.5 °C), Max:98.6 °F (37 °C)      Exam:  Patient without distress. CTAB, no w/r/r/c.               RRR, +S1 and S2, no m/r/g. Abdomen soft, fundus firm at level of umbilicus, nontender. Lower extremities:  No edema.  No palpable cords or tenderness. Lab/Data Review:  Recent Results (from the past 12 hour(s))   GLUCOSE, POC    Collection Time: 10/25/21  8:23 PM   Result Value Ref Range    Glucose (POC) 109 65 - 117 mg/dL    Performed by Dana Morelos, POC    Collection Time: 10/26/21  2:01 AM   Result Value Ref Range    Glucose (POC) 84 65 - 117 mg/dL    Performed by Camryn Oseguera          Assessment and Plan:    Ashley Mcmullen is a 33 y.o. [id number] s/p  at 39 weeks 3 days. Pregnancy was complicated by GDMA1. Patient appears to be having uncomplicated post-partum course. POD 0 after IOL leading to vaginal delivery:  1. Continue routine perineal care and maternal education. 2. Plan discharge 10/28 if no problems occur. Gestational DM: Fasting BG this AM 84.  - Will need 6 week postpartum GTT.     Abnormal pap: NILM, HPV positive on pap done 21.  - Will need colpo/repeat when postpartum     Depression: Not on meds  - Will need early EPDS and close f/u on d/c. Prenatal information:   - PNL: A+/Ab neg. Hbfrc wnl, HIV/RPR NR. HBsAG neg. RPR/varicella immune. NG/CT negative. GBS negative. COVID negative. NIPT low risk. Normal anatomy. - US at 38 weeks: Vtx, anterior placenta. BPP 8/8. STEPH 15.4 cm. UA doppler 2.41 (<50%)    Patient discussed with Dr. Cristina Llamas.                  Chris Anders MD  Family Medicine Resident

## 2021-10-27 VITALS
OXYGEN SATURATION: 97 % | SYSTOLIC BLOOD PRESSURE: 105 MMHG | DIASTOLIC BLOOD PRESSURE: 59 MMHG | TEMPERATURE: 98.6 F | HEIGHT: 63 IN | HEART RATE: 78 BPM | BODY MASS INDEX: 32.43 KG/M2 | RESPIRATION RATE: 16 BRPM | WEIGHT: 183 LBS

## 2021-10-27 PROCEDURE — 90471 IMMUNIZATION ADMIN: CPT

## 2021-10-27 PROCEDURE — 74011250637 HC RX REV CODE- 250/637: Performed by: OBSTETRICS & GYNECOLOGY

## 2021-10-27 PROCEDURE — 90686 IIV4 VACC NO PRSV 0.5 ML IM: CPT | Performed by: FAMILY MEDICINE

## 2021-10-27 PROCEDURE — 74011250637 HC RX REV CODE- 250/637: Performed by: FAMILY MEDICINE

## 2021-10-27 PROCEDURE — 2709999900 HC NON-CHARGEABLE SUPPLY

## 2021-10-27 PROCEDURE — 74011250636 HC RX REV CODE- 250/636: Performed by: FAMILY MEDICINE

## 2021-10-27 RX ORDER — ACETAMINOPHEN 500 MG
500 TABLET ORAL
Qty: 30 TABLET | Refills: 0 | Status: SHIPPED | OUTPATIENT
Start: 2021-10-27

## 2021-10-27 RX ORDER — IBUPROFEN 800 MG/1
800 TABLET ORAL EVERY 8 HOURS
Qty: 30 TABLET | Refills: 0 | Status: SHIPPED | OUTPATIENT
Start: 2021-10-27

## 2021-10-27 RX ADMIN — IBUPROFEN 800 MG: 800 TABLET, FILM COATED ORAL at 07:49

## 2021-10-27 RX ADMIN — MUPIROCIN: 20 OINTMENT TOPICAL at 10:21

## 2021-10-27 RX ADMIN — INFLUENZA VIRUS VACCINE 0.5 ML: 15; 15; 15; 15 SUSPENSION INTRAMUSCULAR at 09:33

## 2021-10-27 NOTE — PROGRESS NOTES
0920 Pt discharge education provided using video , Miladys Ward #[id number]. Pt verbalized understanding. 1200 Dr. Giang Loges notified of patient's EPDS score of 12.    1350 Patient signed hard copy of discharge instructions due to electronic signature pad not working. Patient off unit in stable condition via wheelchair with volunteers for discharge home per Dr. Giang Loges. Patient is aware to follow-up in 2 weeks. Prescriptions sent to pharmacy. Patient denies any HA, dizziness, N/V, or pain at this time. Infant in car seat and discharged with mother.

## 2021-10-27 NOTE — DISCHARGE INSTRUCTIONS
Patient Education        El período posparto: Instrucciones de cuidado-Instrucciones de cuidado  Postpartum: Care Instructions  Instrucciones de cuidado  Después del parto (período posparto), bowman cuerpo pasa por muchos cambios. Algunos de estos cambios suceden anamaria varias semanas. [address], el cuerpo comienza a recuperarse y se prepara para el amamantamiento. Es posible que [address] se sienta sensible. Las hormonas pueden cambiar bowman estado de ánimo sin advertencia y sin motivo aparente. Anamaria las primeras [address] después del parto, muchas mujeres tienen emociones que [place] de blackwell a flavia. Es posible que le resulte difícil dormir. Es posible que llore mucho. [place] se llama \"melancolía de la maternidad\". Estas emociones abrumadoras suelen desaparecer dentro de unos días o semanas. Connie es importante hablar con bowman médico acerca de roxana sentimientos. Anamaria las primeras semanas después del [place], es fácil cansarse demasiado o sentirse [place]. No nicole demasiados esfuerzos. Descanse cada vez que pueda, acepte la ayuda de los demás, coma rom y deric abundantes líquidos. En el primer par de Tita Gresham de lokesh a savanna, es posible que bowman médico o partera deseen verla y hacer un plan para cualquier atención de seguimiento que usted pueda necesitar. Probablemente tendrá Matilda Casino deyvi completa de posparto dentro de los primeros 3 meses después del [place]. En real momento, bowman médico o partera revisarán bowman recuperación del parto. Él o josue también verán cómo está enfrentando usted roxana emociones y conversarán sobre roxana inquietudes y preguntas. La atención de seguimiento es wei parte clave de bowman tratamiento y seguridad. Asegúrese de hacer y acudir a todas las citas, y llame a bowman médico si está teniendo problemas. También es wei buena idea saber los resultados de roxana exámenes y mantener wei lista de los medicamentos que [place]. ¿Cómo puede cuidarse en el hogar?   · Duerma o descanse cuando bowman bebé lo nicole. · Si es posible, permita que rosina familiares o rosina amigos la ayuden con las tareas del hogar. No intente hacerlo todo usted julio. · Si tiene hemorroides o hinchazón o dolor alrededor de la abertura de la vagina, pruebe aplicarse frío y calor. Puede aplicarse hielo o wei compresa fría en la terrell anamaria 10 a 20 minutos cada vez. Póngase un paño spear entre el hielo y la piel. Además, trate de sentarse en algunos centímetros de agua tibia (baño de asiento) 3 veces al día y después de las evacuaciones. · [employer] analgésicos (medicamentos para el dolor) exactamente según las indicaciones. ? Si el médico le recetó un analgésico, tómelo según las indicaciones. ? Si no está tomando un analgésico recetado, pregúntele a bowman médico si puede giuseppe nicola de [employer]. · Coma más fibra para evitar el estreñimiento. Incluya alimentos yoel panes y cereales integrales, verduras crudas, frutas crudas y secas, y frijoles (habichuelas). · Tanvi mucho líquido. Si tiene wei enfermedad renal, cardíaca o hepática y tiene que restringir los líquidos, hable con bowman médico antes de aumentar la cantidad de líquido que refugio. · No se lave el interior de la vagina con líquidos (lavados vaginales). · Si tiene puntos de sutura, mantenga la terrell limpia con agua tibia, ya sea echándola o rociándola sobre la terrell externa de la vagina y el ano después de usar el baño. · Lleve wei lista de preguntas para hacerle a bowman médico o partera. Rosina preguntas podrían ser acerca de lo siguiente:  ? Cambios en los senos, yoel bultos o sensibilidad. ? Cuándo esperar que regrese bowman período menstrual.  ? Qué forma de anticoncepción es la más adecuada para usted. ? El peso que aumentó anamaria el [facility]. ? Las opciones de [address]. ? Alma Scrivener y bebidas son mejores para usted, sobre todo si está amamantando. ? Problemas que podría tener con la lactancia. ? Cuándo puede [employer].  Algunas mujeres ree vez quieran hablar sobre lubricantes vaginales. ? Cualquier sentimiento de tristeza o inquietud que tenga. ¿Cuándo debe pedir ayuda? Llame al 911  en cualquier momento que considere que necesita atención de [place]. Por ejemplo, llame si:    · Tiene pensamientos de lastimarse o de hacerle daño a bowman bebé o a otra persona.     · Se desmayó (perdió el conocimiento).     · Tiene dolor en el pecho, le falta el aire o tose cory.     · Tiene convulsiones. Llame a bowman médico ahora mismo o busque atención médica de inmediato si:    · Bowman sangrado vaginal parece hacerse más abundante.     · Se siente mareada o aturdida, o que está a punto de desmayarse.     · Tiene fiebre.     · Tiene dolor abdominal nuevo o más intenso.     · Tiene síntomas de un coágulo de cory en la pierna (que se llama trombosis venosa profunda), yoel:  ? Dolor en la pantorrilla, el muslo, la ministerio o detrás de la rodilla. ? Enrojecimiento e hinchazón en la pierna o la ministerio.     · Tiene señales de preeclampsia, yoel:  ? Hinchazón repentina de la cari, las britney o los pies. ? Nuevos problemas de la vista (yoel oscurecimiento, ke borroso o ke puntos). ? Dolor de ben intenso. Preste especial atención a los cambios en bowman maliha y asegúrese de comunicarse con bowman médico si:    · Tiene nuevo flujo vaginal o jason empeora.     · Se siente flavia o deprimida.     · Tiene problemas con los senos o el amamantamiento. ¿Dónde puede encontrar más información en inglés? Vaya a [email or web address].[email or web address]  Corinna [id number] en la búsqueda para aprender más acerca de \"El período posparto: Instrucciones de cuidado-Instrucciones de cuidado. \"  Revisado: 16 junio, 2021               Versión del contenido: 13.0  © 4873-6846 Healthwise, [employer]. Las instrucciones de cuidado fueron adaptadas bajo licencia por [employer] (which disclaims liability or warranty for this information).  Si usted tiene [place] afección [facility] o sobre estas instrucciones, siempre pregunte a bowman profesional de maliha. [employer], [facility] niega toda garantía o responsabilidad por bowman uso de esta información. Patient Discharge Instructions    Ceasar Khan / [id number] : 1990    Admitted 10/25/2021 Discharged: 10/27/2021       Por favor tenga jason documento presente en bowman deyvi de seguimiento con bowman médico primario. Primary care provider:  Reina Hammer DO    Discharging provider:  Citlali Jc MD  - Family Medicine Resident  Day Peña MD -  OBGYN attending          [address]:  · Pregnancy with 39 completed weeks gestation [Z3A.39]        RECOMENDACIONES DE CUIDADO:     Follow-up Information     Follow up With Specialties Details Why Contact Info    Cari Nevarez DO Family Medicine Go in 6 weeks For post-partum follow-up [address]  [phone]            Continue Jordan Haney:  - Por favor continue Motrin 800 mg, 1 tableta cada 8 horas mientras sea necesario para el dolor.  - Por favor continue Colace 100 mg para el estreñimiento, tome 1 tableta dos veces al día hasta que pueda defercar regularmente sin necesidad del medicamento. - Por favor comience Ferrous Sulfate 325 mg para la anemia, [place] 1 tableta 1 veces día con jugo de naranja. - Por favor continue tomando roxana vitaminas prenatales. Pruebas de seguimiento que necesita: 6-week 2-hr OGTT    Resultados pendientes:   En el momento de bowman de doris los [address] de las siguientes pruebas están pendiente:  Dwayne Garcia. Por favor discuta estos resultados con bowman proveedor primario en bowman deyvi de seguimiento. Importante que [employer] siguientes síntomas: dolor de [place], falta de [place], [place], escalofríos, náusea, vómito, diarrea, cambios en bowman estado mental, caídas, debilidad y sangrado.       DIETA/que comer:  Diabetica    ACTIVIDAD FÍSICA:   Instrucciones de [facility]    No levante nada que sea más pesado que bowman bebé por las próximas 6 semanas. No insertar nada por la vaginal por 6 semanas. Luego del parto por cesárea/ vaginal debe evitar tener relaciones sexuales por 6 semanas. Tendrá bowman deyvi de seguimiento posparto a las 6 semanas. Puede manejar bowman vehículo mientras no esté tomando Percocet ni ningún medicamento nárcotico (Motrin está rom). Cuidado de [place]:  llene el marleen bottle con agua tibia y exprima hasta que salga un chorro de agua por la boquilla para ayudarle a limpiar el área perineal.      Entiendo que si surge algún problema cuando llegue a mi hogar puedo contactar a mi médico.    Me valdez explicado las siguientes instrucciones y valdez aclarado mis dudas. Entiendo y reconozco las instrucciones brindadas.                                                                                                                                                Firma de Scottie Ciprobert / Michelle Booze                                                                 Fecha/Hora                                                                                                                                              Firma de paciente ó representante                                                         Fecha/Hora

## 2021-10-27 NOTE — PROGRESS NOTES
[address] [facility] [address]   Office [phone], Fax [phone]                                Post-Partum Day Number 1 Progress Note    Patient doing well post-partum without significant complaint. Pain well controlled. Lochia minimal.  Tolerating regular PO diet. Ambulating. Voiding without difficulty. Reports flatus. Denies BM. Vitals:  No data found. Temp (24hrs), Av.3 °F (36.8 °C), Min:98 °F (36.7 °C), Max:98.6 °F (37 °C)      Exam:  Patient without distress. CTAB, no w/r/r/c.               RRR, +S1 and S2, no m/r/g. Abdomen soft, fundus firm at level of umbilicus, nontender. Perineum with normal lochia noted. Lower extremities:  No edema. No palpable cords or tenderness. Lab/Data Review:  No results found for this or any previous visit (from the past 12 hour(s)). Assessment and Plan:      Lauren Murrell is a 33 y.o. [id number] s/p  at 39 weeks 3 days. Pregnancy was complicated by GDMA1. Patient appears to be having uncomplicated post-partum course.       -PNL: A+/Ab neg. Hbfrc wnl, HIV/RPR NR. HBsAG neg. RPR/varicella immune. NG/CT negative. GBS negative. COVID negative. NIPT low risk. Normal anatomy. -US at 38 weeks: Vtx, anterior placenta. BPP 8/8. STEPH 15.4 cm. UA doppler 2.41 (<50%)    SIUP s/p  2/2 IOL:  -Continue routine perineal care and maternal education.    -Plan discharge today if no problems occur.     Gestational DM: Fasting BG yesterday AM 84.  -6-week postpartum GTT.     Abnormal pap: NILM, HPV positive on pap done 21.  -Colpo/repeat when postpartum     Depression: Not on meds  -Close f/u on d/c.        Patient discussed with Dr. India Oro.                  Gilda Cronin MD  Family Medicine Resident

## 2021-10-27 NOTE — LACTATION NOTE
This note was copied from a baby's chart. Chart shows numerous feedings, void, stool WNL. Discussed importance of monitoring outputs and feedings on first week of life. Discussed ways to tell if baby is  getting enough breast milk, ie  voids and stools, change in color of stool, and return to birth wt within 2 weeks. Follow up with pediatrician visit for weight check in 1-2 days (per AAP guidelines.)  Encouraged to call Warm Line  [phone]  for any questions/problems that arise. Mother also given breastfeeding support group dates and times for any future needsReviewed effects/risks of late  birth on initiation of breastfeeding including infant's sleepiness, ineffective or missed breastfeedings, infant's decreased stamina to sustain prolonged latch and effective breastfeeding, decreased energy reserves related to low birth wt and inability to stimulate milk supply. Recommended interventions include skin to skin bonding at breast, hand expression of colostrum as infant rests at breast and initiation of breastfeeding as infant is able, initiation of pumping regimen to begin within 6 hours of birth as mom is able; complement/supplement feeding as guided by neonatologist. Anticipatory guidance given. Questions answered. Discussed signs of baby's allergy, excema. Discussed engorgement management, when breast are soft and flat you are making more milk than when hard and engorged. If you should have to take a medication and MD says can't breast feed contact lactation office. Breast feed if you or the baby gets sick to pass along natural immunologic protection. Pt will successfully establish breastfeeding by feeding in response to early feeding cues   or wake every 3h, will obtain deep latch, and will keep log of feedings/output. Taught to BF at hunger cues and or q 2-3 hrs and to offer 10-20 drops of hand expressed colostrum at any non-feeds.       Breast Assessment  Left Breast: Large  Left Nipple: Everted, Short, Tender  Right Breast: Large  Right Nipple: Everted, Short, Tender  Breast- Feeding Assessment  Breast-Feeding Experience: Yes (5th baby to breastfeed)  Breast Trauma/Surgery: No  Type/Quality: Good  Lactation Consultant Visits  Breast-Feedings: Good   Mother/Infant Observation  Mother Observation: Alignment, Breast comfortable, Nipple round on release, Close hold, Holds breast  Infant Observation: Lips flanged, lower, Latches nipple and aereolae, Rhythmic suck, Relaxed after feeding, Feeding cues, Opens mouth, Lips flanged, upper, Audible swallows, Frenulum checked  LATCH Documentation  Latch: Grasps breast, tongue down, lips flanged, rhythmic sucking  Audible Swallowing: Spontaneous and intermittent (24 hours old)  Type of Nipple: Everted (after stimulation)  Comfort (Breast/Nipple): Filling, red/small blisters/bruises, mild/mod discomfort  Hold (Positioning): No assist from staff, mother able to position/hold infant  LATCH Score: 9      Gave Mom Yi breastfeeding booklet. Ordered parker mckeon's for tender nipples. Instructed pt on use. Care for sore/tender nipples discussed:  ways to improve positioning and latch practiced and discussed, hand express colostrum after feedings and let air dry, light application of lanolin, hydrogel pads, seek comfortable laid back feeding position, start feedings on least sore side first.    Gave Mom a hand pump. Instructed Mom to wait 3 weeks before feeding baby formula so that she establishes a good supply. Showed her how to use hand pump.

## 2021-10-27 NOTE — DISCHARGE SUMMARY
Obstetrical Discharge Summary     Name: Steve Matthews MRN: [id number]  SSN: [id number]    YOB: 1990  Age: 33 y.o. Sex: female      Admit Date: 10/25/2021    Discharge Date: 10/27/2021     Admitting Physician: Rufus Del Castillo DO     Attending Physician:  Casimiro Garcia DO     Admission Diagnoses: Pregnancy with 39 completed weeks gestation [Z3A.39]    Discharge Diagnoses:   Information for the patient's : Bryan Colby, Male Nancy Sandy [[id number]]   Delivery of a 3.015 kg male infant via Vaginal, Spontaneous on 10/26/2021 at 4:01 AM  by [place]. Apgars were 8  and 9 . Additional Diagnoses:   Hospital Problems  Date Reviewed: 10/6/2021        Codes Class Noted POA    Pregnancy with 44 completed weeks gestation ICD-10-CM: Z3A.39  ICD-9-CM: V22.2  10/25/2021 Unknown             Lab Results   Component Value Date/Time    Rubella, External immune 2021 12:00 AM    GrBStrep, External Negativr 10/06/2021 12:00 AM       Immunization(s):   Immunization History   Administered Date(s) Administered    Influenza Vaccine MDLIVE) PF (>6 Mo Flulaval, Fluarix, and [facility]) 10/27/2021    Tdap 2021        Hospital Course:   Patient is a 33 y.o. [id number] s/p  at 39 weeks 3 days. Presented for  Induction of labor in setting of GDM. Pregnancy complicated by GDMA1 and maternal depression. Labor was uncomplicated. Delivered TLMI by Dr. Roger Hollis without complications. Normal hospital course following the delivery. On day of discharge patient reported minimal lochia, well controlled pain, and no other complaints. Discharged with pain regimen and bowel regimen. Advised to continue prenatal vitamins, and motrin for pain. Follow up with [facility] in six weeks.       Depression Scale        Postpartum Depression: High Risk    Last EPDS Total Score: 12    Last EPDS Self Harm Result: Never       Follow up test at discharge: 6-week post-partum 2-hr OGTT  Condition at Discharge:  Stable  Disposition: Discharge to Home    Physical exam:  Visit Vitals  BP (!) 105/59 (BP 1 Location: Right arm, BP Patient Position: At rest)   Pulse 78   Temp 98.6 °F (37 °C)   Resp 16   Ht 5' 3.39\" (1.61 m)   Wt 83 kg (183 lb)   LMP 01/23/2021 (Exact Date)   SpO2 97%   Breastfeeding Unknown   BMI 32.02 kg/m²       Exam:  Patient without distress. CTAB, no w/r/r/c               RRR, + S1 and S2, no m/r/g               Abdomen soft, fundus firm at level of umbilicus, non tender               Perineum with normal lochia noted. Lower extremities are negative for swelling, cords or tenderness. Patient Instructions:   Current Discharge Medication List      CONTINUE these medications which have NOT CHANGED    Details   prenatal vit calc,iron,folic (PRENATAL VITAMIN PO) Take  by mouth. !! TRUEplus Lancets 33 gauge misc CHECK BLOOD GLUCOSE 4 TIMES DAILY. FASTING AND 2 HOURS AFTER BREAKFAST LUNCH AND DINNER. GOAL OF GLUCOSE 95 FASTING  POSTPRANDIAL      True Metrix Air Glucose Meter misc See Admin Instructions. Blood-Glucose Meter monitoring kit Relion monitor. Use as directed  Qty: 1 Kit, Refills: 0    Associated Diagnoses: Diet controlled gestational diabetes mellitus (GDM) in second trimester      !! lancets misc Check blood glucose 4 times daily. Fasting and 2 hour after breakfast, lunch, and dinner. Goal of glucose <95 fasting and <120 postprandial  Qty: 1 Each, Refills: 11    Associated Diagnoses: Diet controlled gestational diabetes mellitus (GDM) in second trimester      glucose blood VI test strips (blood glucose test) strip Check blood glucose 4 times daily. Fasting and 2 hour after breakfast, lunch, and dinner. Goal of glucose <95 fasting and <120 postprandial  Qty: 90 Strip, Refills: 2    Associated Diagnoses: Diet controlled gestational diabetes mellitus (GDM) in second trimester       !! - Potential duplicate medications found.  Please discuss with provider. Reference my discharge instructions.     Follow-up Information     Follow up With Specialties Details Why Contact Info    Colby Nevarez, DO Family Medicine Go in 6 weeks For post-partum follow-up [address] [place] [address]  [phone]              Signed By:  Joseph Garcia MD    Family Medicine Resident

## 2021-11-17 ENCOUNTER — ROUTINE PRENATAL (OUTPATIENT)
Dept: FAMILY MEDICINE CLINIC | Age: 31
End: 2021-11-17
Payer: MEDICAID

## 2021-11-17 VITALS
WEIGHT: 162 LBS | DIASTOLIC BLOOD PRESSURE: 60 MMHG | HEIGHT: 64 IN | OXYGEN SATURATION: 97 % | TEMPERATURE: 97.8 F | SYSTOLIC BLOOD PRESSURE: 95 MMHG | RESPIRATION RATE: 16 BRPM | HEART RATE: 72 BPM | BODY MASS INDEX: 27.66 KG/M2

## 2021-11-17 PROCEDURE — 0503F POSTPARTUM CARE VISIT: CPT | Performed by: STUDENT IN AN ORGANIZED HEALTH CARE EDUCATION/TRAINING PROGRAM

## 2021-11-17 NOTE — PROGRESS NOTES
[address] [facility] [address] [facility]   Office [phone], Fax [phone]    Subjective:     History provided by patient. 2 Week Post Partum Note    33 y.o. female , presenting for 2 week postpartum visit and depression follow up, s/p  at 39w3d. Patient doing ok post-partum, feeling somehow down/depressed. Lochia: Minimal  Pain: Not at all  Baby: doing well, has seen PCP  Sexual activity: No   Plan for contraception: Yes. Form filled out for IUD. Breast/bottle: Both  Support from FOB/family: Pt living with his 5 children and one of her friends. She is not currently working. FOB left couple days ago. She feels bad about it. Symptoms of depression: She mentioned that she has been feeling somehow depressed due to financial issues and now exacerbated by FOB leaving the house because they were having some problems. She is crying sometimes. But remains hopeful that this will pass and she is also very [employer]ism and is planning on going to [employer]. She DENIES any SI/SH thoughts towards herself or the baby. Pt does not want to take any medications at this time. Arena Depression Scale Score: 18.   <8= depression not likely continue support   9-11= depression possible, support and re-screen in 2-4 weeks  12-13= high possibility of depression, monitor, support and offer education  14 and higher= probable depression, treat     SocHx. - Denies smoking, alcohol use, illcit drug use  - Sexually active: No   - Occupation: Not working at this moment. ROS: Negative besides what is written above. Objective:     Visit Vitals  BP 95/60   Pulse 72   Temp 97.8 °F (36.6 °C) (Temporal)   Resp 16   Ht 5' 3.5\" (1.613 m)   Wt 162 lb (73.5 kg)   SpO2 97%   BMI 28.25 kg/m²           Exam:  Patient without distress. Abdomen soft, and nontender. Lower extremities:  No edema. No palpable cords or tenderness. Varicose veins noted R>L.      Assessment and orders:     Assessment and Plan: Emmy Garces is a 33 y.o. [id number], presenting for 2 week postpartum visit and depression follow up, s/p  at 39w3d. Patient doing ok post-partum, feeling somehow down/depressed. PP Depression: EPDS score: 18. This has been affecting pt throughout her pregnancy. 1. Continue routine care  2. Reassurance provided to patient  3. Short mental therapy provided to patient  4. Does not have any suicidal or self harm thoughts towards herself or the baby. 5. Declined pharmacologic treatment at this time. 6. Advised to contact office/MD/hot line if having suicidal thoughts. Gestational DM:   - Will do 6-weeks postpartum GTT.     Abnormal pap: NILM, HPV positive on pap done 21.  - Colpo/repeat when postpartum    Follow Up: In 2 weeks with me, for close depression follow up. Pt was discussed with Dr Chase Sears (attending physician). I have reviewed patient medical and social history and medications. I have reviewed pertinent labs results and other data. I have discussed the diagnosis with the patient and the intended plan as seen in the above orders. The patient has received an after-visit summary and questions were answered concerning future plans. I have discussed medication side effects and warnings with the patient as well.     Mikhail Stewart MD  [facility]ident [facility]

## 2021-11-17 NOTE — PROGRESS NOTES
Chief Complaint   Patient presents with   [address]     1. Have you been to the ER, urgent care clinic since your last visit? Hospitalized since your last visit? nO    2. Have you seen or consulted any other health care providers outside of the [address] [place] [address] since your last visit? Include any pap smears or colon screening.  nO

## 2021-11-23 ENCOUNTER — TELEPHONE (OUTPATIENT)
Dept: FAMILY MEDICINE CLINIC | Age: 31
End: 2021-11-23

## 2021-11-23 NOTE — TELEPHONE ENCOUNTER
----- Message from Davina Mi MD sent at 11/18/2021 10:39 PM EST -----  Regarding: In person visit  Hi,     Could you please schedule in person visit in 2 weeks with ME to follow up regarding Postpartum depression. Thank you,     Dr. Jeyson Benitez.

## 2022-03-09 NOTE — PATIENT INSTRUCTIONS
Semanas 32 a 34 de cunha embarazo: Instrucciones de cuidado  Weeks 32 to 34 of Your Pregnancy: Care Instructions  Instrucciones de cuidado     [address] las [place] de cunha [facility] podría sentir más lukasz y [place]. Es importante que descanse cuando pueda. Cunha bebé en crecimiento está ejerciendo más presión sobre cunha vejiga. Por eso, ree vez necesite orinar con más frecuencia. Las hemorroides también son comunes. Estas son venas en la terrell del recto que causan dolor y comezón. En la semana 36, a la mayoría de las [employer] un análisis para detectar el estreptococo del triny B (GBS, por roxana siglas en inglés). El GBS es wei bacteria común que puede vivir en la vagina y el recto. Podría enfermar a cunha bebé después del nacimiento. Si el [employer] positivo, le darán antibióticos anamaria el [place]. Estos prevendrán que el bebé se contagie con la bacteria. Podría convenirle hablar con cunha médico sobre poner en un banco la cory del cordón umbilical de cunha bebé. Esta es la cory que queda en el cordón después del nacimiento. Si desea guardar esta cory, debe hacer los trámites necesarios con anticipación. No puede decidirlo en el último minuto. Si todavía no le valdez aplicado la vacuna Tdap (tétanos, difteria y tos [place]) anamaria jason Mary, hable con cunha médico acerca de aplicársela. Catarino Fuse a proteger a cunha recién nacido contra la infección por tos ferina. La atención de seguimiento es wei parte clave de cunha tratamiento y seguridad. Asegúrese de hacer y acudir a todas las citas, y llame a cunha médico si está teniendo problemas. También es wei buena idea saber los resultados de roxana exámenes y mantener wei lista de los medicamentos que [place]. ¿Cómo puede cuidarse en el hogar? Alivio de las hemorroides  · Aumente en cunha dieta la cantidad de líquidos, frutas, verduras y [place]. [place] ayudará a JALEESA [employer] · Evite estar sentada anamaria demasiado tiempo.  Recuéstese [facility] lado manav varias veces al día. · Límpiese con papel higiénico suave y húmedo. O puede utilizar compresas de infusión de hamamelis [facility] (\"witch hazel\") o toallas de higiene personal.  · Si tiene malestar, pruebe a usar compresas de hielo. O puede hacer omero de asiento tibios. Hágalos anamaria 20 minutos por vez, según lo necesite. · Use wei crema con hidrocortisona para el dolor y la picazón. [place] son Anusol y Preparation H Hydrocortisone. · Pregúntele a bowman médico si puede giuseppe un ablandador de heces de venta mehdi. Considere el amamantamiento  · Los especialistas recomiendan que las mujeres amamanten por 1 año o [place]. · [place] ayudar a proteger a bowman hijo contra algunos problemas de maliha. En comparación con los bebés que [address] [facility], los bebés que fritz leche materna tienen menos probabilidades de:  ? Olivia Lipps infecciones de oído, resfriados, diarrea y neumonía. ? Ser obesos o tener diabetes más adelante en bowman nighat. · [employer] a rosina bebés tienen menos sangrado después del [place]. Rosina úteros también recobran bowman tamaño normal más rápido. · Algunas mujeres que amamantan bajan de [place] rápido. Elaborar leche quema calorías. · El amamantamiento puede disminuir el riesgo de tener cáncer de seno (mama), cáncer de ovario y osteoporosis. Decida sobre la circuncisión para los niños  · Al giuseppe esta decisión, podría ser de ayuda pensar en rosina tradiciones personales, religiosas y familiares. Es bowman decisión si desea conservar el pene de bowman hijo natural o circuncidar a bowman hijo. · Si decide que le gustaría que circuncidaran a bowman bebé, hable con bowman médico. Discuta cualquier inquietud que tenga sobre el dolor. También puede hablar acerca de rosina preferencias para la anestesia. ¿Dónde puede encontrar más información en inglés?   Vaya a http://www.fernandez.com/  Corinna [id number] en la búsqueda para aprender más acerca de \"Semanas 32 a 29 de bowman embarazo: Instrucciones de cuidado. \"  Revisado: 8 octubre, 2020               Versión del contenido: 12.8  © 8526-5468 [employer]wise, [employer]. Las instrucciones de cuidado fueron adaptadas bajo licencia por [employer] (which disclaims liability or warranty for this information). Si usted tiene [place] [employer] [place] afección médica o sobre estas instrucciones, siempre pregunte a bowman profesional de maliha. [employer], [employer] niega toda garantía o responsabilidad por bowman uso de esta información. no

## 2022-03-18 PROBLEM — I83.811 VARICOSE VEINS OF RIGHT LOWER EXTREMITY WITH PAIN: Status: ACTIVE | Noted: 2021-10-11

## 2022-03-18 PROBLEM — Z3A.39 PREGNANCY WITH 39 COMPLETED WEEKS GESTATION: Status: ACTIVE | Noted: 2021-10-25

## 2022-03-18 PROBLEM — O99.891 ASYMPTOMATIC BACTERIURIA DURING PREGNANCY: Status: ACTIVE | Noted: 2021-06-22

## 2022-03-18 PROBLEM — R82.71 ASYMPTOMATIC BACTERIURIA DURING PREGNANCY: Status: ACTIVE | Noted: 2021-06-22

## 2022-03-19 PROBLEM — B97.7 HUMAN PAPILLOMAVIRUS (HPV) AFFECTING PREGNANCY IN SECOND TRIMESTER: Status: ACTIVE | Noted: 2021-06-22

## 2022-03-19 PROBLEM — M79.671 RIGHT FOOT PAIN: Status: ACTIVE | Noted: 2017-03-02

## 2022-03-19 PROBLEM — O99.340 DEPRESSION AFFECTING PREGNANCY: Status: ACTIVE | Noted: 2021-10-11

## 2022-03-19 PROBLEM — O24.410 DIET CONTROLLED GESTATIONAL DIABETES MELLITUS (GDM), ANTEPARTUM: Status: ACTIVE | Noted: 2021-09-01

## 2022-03-19 PROBLEM — Z34.90 PREGNANCY: Status: ACTIVE | Noted: 2021-09-01

## 2022-03-19 PROBLEM — K21.9 GASTROESOPHAGEAL REFLUX DISEASE: Status: ACTIVE | Noted: 2021-09-01

## 2022-03-19 PROBLEM — F32.A DEPRESSION AFFECTING PREGNANCY: Status: ACTIVE | Noted: 2021-10-11

## 2022-03-19 PROBLEM — O98.312 HUMAN PAPILLOMAVIRUS (HPV) AFFECTING PREGNANCY IN SECOND TRIMESTER: Status: ACTIVE | Noted: 2021-06-22

## 2022-03-20 PROBLEM — O99.210 OBESITY AFFECTING PREGNANCY, ANTEPARTUM: Status: ACTIVE | Noted: 2021-06-22

## 2022-09-26 ENCOUNTER — TELEPHONE (OUTPATIENT)
Dept: FAMILY MEDICINE CLINIC | Age: 32
End: 2022-09-26

## 2022-09-26 NOTE — TELEPHONE ENCOUNTER
----- Message from Tika Snyder, DO sent at 9/12/2022 10:59 AM EDT -----  Hi can you please call and schedule this patient for a pap?   Thanks, MV

## 2022-09-26 NOTE — TELEPHONE ENCOUNTER
Attempted to reach patient using  Callie. The number listed on patient chart does not belong to patient and the number for the spouse just rings.

## 2023-05-25 RX ORDER — ACETAMINOPHEN 500 MG
500 TABLET ORAL EVERY 6 HOURS PRN
COMMUNITY
Start: 2021-10-27

## 2023-05-25 RX ORDER — IBUPROFEN 800 MG/1
800 TABLET ORAL EVERY 8 HOURS
COMMUNITY
Start: 2021-10-27